# Patient Record
Sex: FEMALE | Race: WHITE | Employment: OTHER | URBAN - METROPOLITAN AREA
[De-identification: names, ages, dates, MRNs, and addresses within clinical notes are randomized per-mention and may not be internally consistent; named-entity substitution may affect disease eponyms.]

---

## 2019-05-09 ENCOUNTER — TRANSFERRED RECORDS (OUTPATIENT)
Dept: HEALTH INFORMATION MANAGEMENT | Facility: CLINIC | Age: 84
End: 2019-05-09

## 2019-06-03 ENCOUNTER — TRANSFERRED RECORDS (OUTPATIENT)
Dept: HEALTH INFORMATION MANAGEMENT | Facility: CLINIC | Age: 84
End: 2019-06-03

## 2019-07-19 ENCOUNTER — TRANSFERRED RECORDS (OUTPATIENT)
Dept: HEALTH INFORMATION MANAGEMENT | Facility: CLINIC | Age: 84
End: 2019-07-19

## 2019-08-27 ENCOUNTER — TRANSFERRED RECORDS (OUTPATIENT)
Dept: HEALTH INFORMATION MANAGEMENT | Facility: CLINIC | Age: 84
End: 2019-08-27

## 2019-10-07 ENCOUNTER — OFFICE VISIT (OUTPATIENT)
Dept: NEUROSURGERY | Facility: CLINIC | Age: 84
End: 2019-10-07
Attending: NEUROLOGICAL SURGERY
Payer: MEDICARE

## 2019-10-07 VITALS
HEART RATE: 66 BPM | DIASTOLIC BLOOD PRESSURE: 82 MMHG | HEIGHT: 67 IN | OXYGEN SATURATION: 98 % | TEMPERATURE: 97.9 F | SYSTOLIC BLOOD PRESSURE: 133 MMHG | BODY MASS INDEX: 21.03 KG/M2 | WEIGHT: 134 LBS | RESPIRATION RATE: 16 BRPM

## 2019-10-07 DIAGNOSIS — M54.16 LUMBAR RADICULOPATHY: Primary | ICD-10-CM

## 2019-10-07 PROCEDURE — G0463 HOSPITAL OUTPT CLINIC VISIT: HCPCS

## 2019-10-07 PROCEDURE — 99213 OFFICE O/P EST LOW 20 MIN: CPT | Performed by: NEUROLOGICAL SURGERY

## 2019-10-07 RX ORDER — LOSARTAN POTASSIUM 50 MG/1
50 TABLET ORAL DAILY
COMMUNITY

## 2019-10-07 RX ORDER — TIMOLOL MALEATE 2.5 MG/ML
1 SOLUTION OPHTHALMIC DAILY
COMMUNITY

## 2019-10-07 ASSESSMENT — PAIN SCALES - GENERAL: PAINLEVEL: NO PAIN (0)

## 2019-10-07 ASSESSMENT — MIFFLIN-ST. JEOR: SCORE: 1090.45

## 2019-10-07 NOTE — NURSING NOTE
"Mirlande Simons is a 84 year old female who presents for:  Chief Complaint   Patient presents with     Follow Up        Initial Vitals:  /82   Pulse 66   Temp 97.9  F (36.6  C) (Oral)   Resp 16   Ht 5' 7\" (1.702 m)   Wt 134 lb (60.8 kg)   SpO2 98%   BMI 20.99 kg/m   Estimated body mass index is 20.99 kg/m  as calculated from the following:    Height as of this encounter: 5' 7\" (1.702 m).    Weight as of this encounter: 134 lb (60.8 kg).. Body surface area is 1.7 meters squared. BP completed using cuff size: regular  No Pain (0)        Nursing Comments: Pt present today for follow up.        Kyle Patel CMA    "

## 2019-10-07 NOTE — LETTER
10/7/2019         RE: Mirlande Simons  [address]  [place] [facility] [address]        Dear Colleague,    Thank you for referring your patient, Mirlande Simons, to the [facility]. Please see a copy of my visit note below.    Ms. Simons is seen in follow-up for persistent difficulty with right leg symptoms.  I was able to access her most recent clinic note from [facility], previously dictated by Ms. Jaye Becerra.  Relevant history is pasted below:    Mirlande Simons is a 84 y.o. female who presents to the clinic for consultation on a five-month history of radicular pain in primarily the right L5 distribution. The patient's outside imaging from May 2019 was reviewed with her today. It was explained that images show multi-level degenerative changes with findings most notable for lateral recess narrowing at the right L4-5 level, and possibly right L5-S1 level as well. On exam, the patient is noted to have decreased sensation along the medial aspect of the right foot and great toe. The patient has attempted conservative management with acupuncture and two lumbar spine ESIs (as ordered through [facility]), without resolution of symptoms.     Surgical options were discussed with the patient today. Based on imaging and clinical evaluation, Ms. Simons would likely benefit from a minimally invasive surgery involving a right-sided lateral recess decompression at the L4-5, and possibly L5-S1, level. For further understanding as to whether surgery should involve a 1- or 2-level decompression, an updated lumbar spine MRI would be necessary to obtained angled axial views of the L5-S1 level. Benefits as well as possible surgical risks were reviewed today including risk of nerve damage, infection, bleeding, residual or recurrent disk / symptoms, and spinal fluid leak were reviewed with the patient. An educational video was viewed by the patient for further  understanding of the surgery as well as pre-operative and post-operative management.     The procedure was described as being a minimally invasive same-day surgery to be performed at [facility]. Normal postoperative recovery was summarized and included not lifting anything greater than 5-10 pounds, avoid excessive bending, twisting, and turning, and to make frequent position changes about every 30 minutes going from sitting, standing, and walking for six weeks approximately. The timing of post-operative follow-up appointments were also discussed. These will take place in the [facility].    As Ms. Simons is not ready to pursue surgery at this time, we will order her a right-sided interlaminar LISA at the L5-S1 level for pain management. When she is ready to proceed with surgery, she will call and updated that clinic and lumbar spine imaging will be repeated. Follow-up in the clinic for surgical planning will then be appropriate.    The patient appeared to have a good understanding of the situation and asked appropriate questions which were answered. If changes or worsening of symptoms should occur in the future, she should seek being evaluated.     She did pursue a third time L5-S1 epidural steroid injection which produced significant improvement in the majority of her right leg pain.  She is seen today at her request because of her persistent feeling of weakness with her right leg when she is ambulating.  She also reports an antalgic type gait toward the right side present with weightbearing and relieved partially with the use of a grocery cart while shopping.    I reexamined her today in the office.  She has a somewhat short stance phase toward the right leg but does not appear to have pain localized to either her right hip or her right knee where she has an arthroplasty.  She has good strength in her distal bilateral lower extremities including 5/5 strength with plantar and dorsiflexion at the  ankles while weightbearing.  Deep tendon reflexes are 1+ and equal at the knees and ankles bilaterally.  There are no long track findings noted.    I again reviewed her most recent lumbar MRI scan which shows fairly significant scoliotic deformity with substantial hypertrophy of the facets through the lower lumbar spine toward the right side which is concave.  She has restricted lateral recess at multiple levels and possibly nerve root impingement right L4-5 and is severely stenotic lateral recess as well as similar changes at right L2-3 and at multiple foramen on the right side.  I had a lengthy discussion with her regarding radiographic findings and the possible correlation to her current symptoms.  I told her that distinguishing the source of the problem is challenging and that the most pronounced abnormality at the L4-5 level does not appear to correlate very well with the pattern or level of her symptoms.  In discussing management alternatives including minimally invasive decompression she expressed a view that she would prefer to consider nonoperative therapies.  I believe that this is sensible given the uncertainty for the origin of this problem and the possibility that even a minimally invasive decompression could introduce significant additional spinal problems.  I have recommended a course of physical therapy with a trial of lumbar traction as well as strengthening exercise for her right lower extremity.  If she finds any symptomatic improvement as a result of that therapy I would not recommend additional invasive measures.  I have asked her to call or return to the office if in fact therapy is not helpful to her.      Again, thank you for allowing me to participate in the care of your patient.        Sincerely,        Leif Santos MD

## 2019-10-07 NOTE — PATIENT INSTRUCTIONS
- Order placed for physical therapy. You can call the phone number highlighted in the order to schedule your appointment. Please call our clinic if symptoms persist after your course of physical therapy.      [facility]  Phone: [phone]  Fax: [phone]

## 2019-10-07 NOTE — PROGRESS NOTES
Ms. Simons is seen in follow-up for persistent difficulty with right leg symptoms.  I was able to access her most recent clinic note from [facility], previously dictated by Ms. Jaye Becerra.  Relevant history is pasted below:    Mirlande Simons is a 84 y.o. female who presents to the clinic for consultation on a five-month history of radicular pain in primarily the right L5 distribution. The patient's outside imaging from May 2019 was reviewed with her today. It was explained that images show multi-level degenerative changes with findings most notable for lateral recess narrowing at the right L4-5 level, and possibly right L5-S1 level as well. On exam, the patient is noted to have decreased sensation along the medial aspect of the right foot and great toe. The patient has attempted conservative management with acupuncture and two lumbar spine ESIs (as ordered through [facility]), without resolution of symptoms.     Surgical options were discussed with the patient today. Based on imaging and clinical evaluation, Ms. Simons would likely benefit from a minimally invasive surgery involving a right-sided lateral recess decompression at the L4-5, and possibly L5-S1, level. For further understanding as to whether surgery should involve a 1- or 2-level decompression, an updated lumbar spine MRI would be necessary to obtained angled axial views of the L5-S1 level. Benefits as well as possible surgical risks were reviewed today including risk of nerve damage, infection, bleeding, residual or recurrent disk / symptoms, and spinal fluid leak were reviewed with the patient. An educational video was viewed by the patient for further understanding of the surgery as well as pre-operative and post-operative management.     The procedure was described as being a minimally invasive same-day surgery to be performed at [facility]. Normal postoperative recovery was summarized and included not lifting  anything greater than 5-10 pounds, avoid excessive bending, twisting, and turning, and to make frequent position changes about every 30 minutes going from sitting, standing, and walking for six weeks approximately. The timing of post-operative follow-up appointments were also discussed. These will take place in the [facility].    As Ms. Simons is not ready to pursue surgery at this time, we will order her a right-sided interlaminar LISA at the L5-S1 level for pain management. When she is ready to proceed with surgery, she will call and updated that clinic and lumbar spine imaging will be repeated. Follow-up in the clinic for surgical planning will then be appropriate.    The patient appeared to have a good understanding of the situation and asked appropriate questions which were answered. If changes or worsening of symptoms should occur in the future, she should seek being evaluated.     She did pursue a third time L5-S1 epidural steroid injection which produced significant improvement in the majority of her right leg pain.  She is seen today at her request because of her persistent feeling of weakness with her right leg when she is ambulating.  She also reports an antalgic type gait toward the right side present with weightbearing and relieved partially with the use of a grocery cart while shopping.    I reexamined her today in the office.  She has a somewhat short stance phase toward the right leg but does not appear to have pain localized to either her right hip or her right knee where she has an arthroplasty.  She has good strength in her distal bilateral lower extremities including 5/5 strength with plantar and dorsiflexion at the ankles while weightbearing.  Deep tendon reflexes are 1+ and equal at the knees and ankles bilaterally.  There are no long track findings noted.    I again reviewed her most recent lumbar MRI scan which shows fairly significant scoliotic deformity with substantial  hypertrophy of the facets through the lower lumbar spine toward the right side which is concave.  She has restricted lateral recess at multiple levels and possibly nerve root impingement right L4-5 and is severely stenotic lateral recess as well as similar changes at right L2-3 and at multiple foramen on the right side.  I had a lengthy discussion with her regarding radiographic findings and the possible correlation to her current symptoms.  I told her that distinguishing the source of the problem is challenging and that the most pronounced abnormality at the L4-5 level does not appear to correlate very well with the pattern or level of her symptoms.  In discussing management alternatives including minimally invasive decompression she expressed a view that she would prefer to consider nonoperative therapies.  I believe that this is sensible given the uncertainty for the origin of this problem and the possibility that even a minimally invasive decompression could introduce significant additional spinal problems.  I have recommended a course of physical therapy with a trial of lumbar traction as well as strengthening exercise for her right lower extremity.  If she finds any symptomatic improvement as a result of that therapy I would not recommend additional invasive measures.  I have asked her to call or return to the office if in fact therapy is not helpful to her.

## 2019-10-14 ENCOUNTER — THERAPY VISIT (OUTPATIENT)
Dept: PHYSICAL THERAPY | Facility: CLINIC | Age: 84
End: 2019-10-14
Attending: NEUROLOGICAL SURGERY
Payer: MEDICARE

## 2019-10-14 DIAGNOSIS — M54.16 LUMBAR RADICULOPATHY: ICD-10-CM

## 2019-10-14 PROCEDURE — 97161 PT EVAL LOW COMPLEX 20 MIN: CPT | Mod: GP | Performed by: PHYSICAL THERAPIST

## 2019-10-14 PROCEDURE — 97110 THERAPEUTIC EXERCISES: CPT | Mod: GP | Performed by: PHYSICAL THERAPIST

## 2019-10-14 NOTE — PROGRESS NOTES
[facility] Initial Evaluation  Subjective:  The history is provided by the patient. No  was used.   Type of problem:  Lumbar   Condition occurred with:  A fall/slip and degenerative joint disease. This is a chronic condition   Problem details: I have scoliosis, stenosis, and piriformis.  In March 2019, I slipped on the steps.   I had two spinal injection but did not work, but the third shot worked.  Three weeks ago  9/17/2019, I slipped in the tub and landed on the left hip bone.  I have right TKA.  When my back is bad the the right knee aches.  Right now I don't have much pain.  If I get out of bed on the left side I have pain.  I am afraid of falling since March.  I have general right hip soreness.  I am very independent..   Patient reports pain:  Central lumbar spine, lumbar spine left and lumbar spine right. Radiates to: weakness not pain  Associated with: weakness and sorenss in the right knee , buckling of the right knee.   Symptoms are exacerbated by walking and relieved by rest.    Mirlande Simons being seen for general leg weakenss right greater than left .   Problem began 10/7/2019. Where condition occurred: for unknown reasons.Problem occurred: unknown  and reported as 2/10 on pain scale. General health as reported by patient is good. Pertinent medical history includes:  High blood pressure, osteoarthritis and osteoporosis.   Other medical allergies details: none.   Other surgery history details: right TKA.  Current medications:  High blood pressure medication.   Primary job tasks include:  Driving, computer work, prolonged sitting and prolonged standing.  Pain is described as aching and shooting and is intermittent. Pain is the same all the time. Since onset symptoms are unchanged (low back pain better, leg weakness the same). Special tests:  MRI (stenosis, scoiliosis). Past treatment: not now.    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    Home/work barriers: apartments-elevator, dog, sons lives with me.  Red flags:  None as reported by patient.                      Objective:  Standing Alignment:    Cervical/thoracic deviations alignment: sitting fair.  Shoulder/UE:  Scapular downward rotation R  Lumbar:  Convex scoliosis R  Pelvic:  Iliac crest high L  Hip:  Greater trochanter high L  Knee deviations alignment: right knee flexed.  Ankle/Foot:  Hallux valgus L and hallux valgus R      Non-Weight Bearing:        Knee:  Patellar lateral tilt R    Flexibility/Screens:         Spine:  Decreased left spine flexibility:  Quadratus Lumborum    Decreased right spine flexibility:  Quadratus Lumborum             Lumbar/SI Evaluation  ROM:    AROM Lumbar:   Flexion:        WFL, no reproduction of pain   Ext:                    Moderate movement   Side Bend:        Left:     Right:   Rotation:           Left:     Right:   Side Glide:        Left:     Right:         Strength: bialteral hip abduction 4.4+/5, TA 1+/5  Lumbar Myotomes:    T12-L3 (Hip Flex):  Left: 5    Right: 4+  L2-4 (Quads):  Left:  5    Right:  5  L4 (Ankle DF):  Left:  5    Right:  5  L5 (Great Toe Ext): Left: 5    Right: 5   S1 (Toe Raise):  Left: 5    Right: 5        Neural Tension/Mobility:      Left side:Slump  negative.   Right side:   Slump (tightness) positive.    Functional Tests:  Core strength and proprioception lumbar: balance 0 sec bilateral                                                    Knee Evaluation:  ROM:      PROM    Hyperextension: Left: 4    Right:  2  Extension: Left: 0    Right:  0  Flexion: Left: 138    Right:  128      Strength:           Quad Set Right: Poor and delayed    Pain:    Special Tests:     Right knee positive for the following tests:  Patellar Tracking-Abduction Lateral  Palpation:      Right knee tenderness present at:  Patellar Lateral    Mobility Testing:       Patellofemoral Medial:  Right: hypomobile              General     ROS    Assessment/Plan:    Patient is a 84 year old female with lumbar and right side knee complaints.    Patient has the following significant findings with corresponding treatment plan.                Diagnosis 1:  Low back pain with right leg weakness  Pain -  manual therapy, self management, education and home program  Decreased joint mobility - manual therapy, therapeutic exercise, therapeutic activity and home program  Decreased strength - therapeutic exercise, therapeutic activities and home program  Impaired balance - neuro re-education, gait training, therapeutic activities and home program  Impaired muscle performance - neuro re-education and home program  Decreased function - therapeutic activities and home program  Impaired posture - neuro re-education, therapeutic activities and home program  Evaluation ongoing.    Therapy Evaluation Codes:   Cumulative Therapy Evaluation is: Low complexity.    Previous and current functional limitations:  (See Goal Flow Sheet for this information)    Short term and Long term goals: (See Goal Flow Sheet for this information)     Communication ability:  Patient appears to be able to clearly communicate and understand verbal and written communication and follow directions correctly.  Treatment Explanation - The following has been discussed with the patient:   RX ordered/plan of care  This patient would benefit from PT intervention to resume normal activities.   Rehab potential is good.    Frequency:  1 X week, once daily  Duration:  for 8 weeks  Discharge Plan:  Achieve all LTG.  Independent in home treatment program.    Please refer to the daily flowsheet for treatment today, total treatment time and time spent performing 1:1 timed codes.

## 2019-10-14 NOTE — LETTER
DEPARTMENT OF HEALTH AND HUMAN SERVICES  CENTERS FOR MEDICARE & MEDICAID SERVICES    PLAN/UPDATED PLAN OF PROGRESS FOR OUTPATIENT REHABILITATION    PATIENTS NAME:  Mirlande Simons   : 1935  PROVIDER NUMBER:    [id number]    HICN: [id number]    PROVIDER NAME: [facility] PH[facility]ICAL [facility]ERAPY    MEDICAL RECORD NUMBER: [id number]     START OF CARE DATE:  SOC Date: 10/14/19   TYPE:  PT    PRIMARY/TREATMENT DIAGNOSIS: (Pertinent Medical Diagnosis)  Lumbar radiculopathy    VISITS FROM START OF CARE:  Rxs Used: 1     [facility] Initial Evaluation    Subjective:  The history is provided by the patient. No  was used.   Type of problem:  Lumbar  Condition occurred with:  A fall/slip and degenerative joint disease. This is a chronic condition   Problem details: I have scoliosis, stenosis, and piriformis.  In 2019, I slipped on the steps.   I had two spinal injection but did not work, but the third shot worked.  Three weeks ago  2019, I slipped in the tub and landed on the left hip bone.  I have right TKA.  When my back is bad the the right knee aches.  Right now I don't have much pain.  If I get out of bed on the left side I have pain.  I am afraid of falling since March.  I have general right hip soreness.  I am very independent..   Patient reports pain:  Central lumbar spine, lumbar spine left and lumbar spine right. Radiates to: weakness not pain  Associated with: weakness and sorenss in the right knee , buckling of the right knee.   Symptoms are exacerbated by walking and relieved by rest.  Mirlande Simons being seen for general leg weakenss right greater than left .   Problem began 10/7/2019. Where condition occurred: for unknown reasons.Problem occurred: unknown  and reported as 2/10 on pain scale. General health as reported by patient is good. Pertinent medical history includes:  High blood pressure, osteoarthritis and osteoporosis.    Other medical allergies details: none.   Other surgery history details: right TKA.  Current medications:  High blood pressure medication.   Primary job tasks include:  Driving, computer work, prolonged sitting and prolonged standing.  Pain is described as aching and shooting and is intermittent. Pain is the same all the time. Since onset symptoms are unchanged (low back pain better, leg weakness the same). Special tests:  MRI (stenosis, scoiliosis). Past treatment: not now.    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  Home/work barriers: apartments-elevator, dog, sons lives with me.  Red flags:  None as reported by patient.            Objective:    PATIENTS NAME:  Mirlande Simons   : 1935    Standing Alignment:    Cervical/thoracic deviations alignment: sitting fair.  Shoulder/UE:  Scapular downward rotation R  Lumbar:  Convex scoliosis R  Pelvic:  Iliac crest high L  Hip:  Greater trochanter high L  Knee deviations alignment: right knee flexed.  Ankle/Foot:  Hallux valgus L and hallux valgus R  Non-Weight Bearing:    Knee:  Patellar lateral tilt R  Flexibility/Screens:   Spine:  Decreased left spine flexibility:  Quadratus Lumborum  Decreased right spine flexibility:  Quadratus Lumborum    Lumbar/SI Evaluation  ROM:    AROM Lumbar:   Flexion:        WFL, no reproduction of pain   Ext:                    Moderate movement   Side Bend:        Left:     Right:   Rotation:           Left:     Right:   Side Glide:        Left:     Right:   Strength: bialteral hip abduction 4.4+/5, TA 1+/5  Lumbar Myotomes:    T12-L3 (Hip Flex):  Left: 5    Right: 4+  L2-4 (Quads):  Left:  5    Right:  5  L4 (Ankle DF):  Left:  5    Right:  5  L5 (Great Toe Ext): Left: 5    Right: 5   S1 (Toe Raise):  Left: 5    Right: 5  Neural Tension/Mobility:    Left side:Slump  negative.   Right side:   Slump (tightness)  positive.  Functional Tests:  Core strength and proprioception lumbar: balance 0 sec bilateral        Knee Evaluation:  ROM:    PROM  Hyperextension: Left: 4    Right:  2  Extension: Left: 0    Right:  0  Flexion: Left: 138    Right:  128  Strength:   Quad Set Right: Poor and delayed    Pain:  Special Tests:   Right knee positive for the following tests:  Patellar Tracking-Abduction Lateral  Palpation:    Right knee tenderness present at:  Patellar Lateral  Mobility Testing:   Patellofemoral Medial:  Right: hypomobile      PATIENTS NAME:  Mirlande Simons   : 1935    Assessment/Plan:    Patient is a 84 year old female with lumbar and right side knee complaints.    Patient has the following significant findings with corresponding treatment plan.                Diagnosis 1:  Low back pain with right leg weakness  Pain -  manual therapy, self management, education and home program  Decreased joint mobility - manual therapy, therapeutic exercise, therapeutic activity and home program  Decreased strength - therapeutic exercise, therapeutic activities and home program  Impaired balance - neuro re-education, gait training, therapeutic activities and home program  Impaired muscle performance - neuro re-education and home program  Decreased function - therapeutic activities and home program  Impaired posture - neuro re-education, therapeutic activities and home program  Evaluation ongoing.    Therapy Evaluation Codes:   Cumulative Therapy Evaluation is: Low complexity.    Previous and current functional limitations:  (See Goal Flow Sheet for this information)    Short term and Long term goals: (See Goal Flow Sheet for this information)     Communication ability:  Patient appears to be able to clearly communicate and understand verbal and written communication and follow directions correctly.  Treatment Explanation - The following has been discussed with the patient:   RX ordered/plan of care  This patient would benefit  "from PT intervention to resume normal activities.   Rehab potential is good.    Frequency:  1 X week, once daily  Duration:  for 8 weeks  Discharge Plan:  Achieve all LTG.  Independent in home treatment program.    Caregiver Signature/Credentials _____________________________ Date ________       Treating Provider: Guerda Fong, PT      I have reviewed and certified the need for these services and plan of treatment while under my care.        PHYSICIAN'S SIGNATURE:   _________________________________________  Date___________   Leif Santos MD    Certification period:  Beginning of Cert date period: 10/14/19 to  End of Cert period date: 12/23/19     Functional Level Progress Report: Please see attached \"Goal Flow sheet for Functional level.\"  ____X____ Continue Services or       ________ DC Services                Service dates: From  SOC Date: 10/14/19 date to present                         "

## 2019-10-31 ENCOUNTER — THERAPY VISIT (OUTPATIENT)
Dept: PHYSICAL THERAPY | Facility: CLINIC | Age: 84
End: 2019-10-31
Payer: MEDICARE

## 2019-10-31 DIAGNOSIS — M54.16 LUMBAR RADICULOPATHY: ICD-10-CM

## 2019-10-31 PROCEDURE — 97110 THERAPEUTIC EXERCISES: CPT | Mod: GP | Performed by: PHYSICAL THERAPIST

## 2019-10-31 PROCEDURE — 97112 NEUROMUSCULAR REEDUCATION: CPT | Mod: GP | Performed by: PHYSICAL THERAPIST

## 2019-10-31 NOTE — LETTER
[facility]  [address]  [place] [address]  [phone]    2019    Re: Mirlande Simons   :   1935  MRN:  [id number]   REFERRING PHYSICIAN:   Leif Santos    [facility]    Date of Initial Evaluation:  10/14/2019  Visits:  Rxs Used: 2  Reason for Referral:  Lumbar radiculopathy    PROGRESS  REPORT  Progress reporting period is from 10/14/2019 to 10/31/2019.       SUBJECTIVE  Subjective changes noted by patient:  I am back to normal.  I am going back to exercises and yoga.   I have a little tenderness along the out side of my right hip     Current Pain level: 0/10.     Previous pain level was  2/10  .   Changes in function:  Yes (See Goal flowsheet attached for changes in current functional level)  Adverse reaction to treatment or activity: None  HPI  Oswestry Score: 0 %    OBJECTIVE  Changes noted in objective findings:  Yes, TROM flexion to the ankles, extension moderate.  Balance fair cueing for proper hip and gluteal control.  Decrease control and engagement of right gluteal.  Strength L/E WFL, right hip abduction 4, 4-/5.  Tenderness along right greater trochanter.  Discussed with patient progression of exercises, health club and home exercise program.     ASSESSMENT/PLAN  Updated problem list and treatment plan: Diagnosis 1:  low back with right leg weakness Decreased ROM/flexibility - home program  Decreased strength - home program  Impaired balance - home program  Impaired muscle performance - home program  Decreased function - home program  STG/LTGs have been met or progress has been made towards goals:  Yes (See Goal flow sheet completed today.)  Assessment of Progress: The patient's condition is improving.  The patient's condition has potential to improve.  Self Management Plans:  Patient has been instructed in a home treatment  program.  Patient  has been instructed in self management of symptoms.    Re: Mirlande Simons   :   1935    Recommendations:  Patient would like to try exercises on her own.  Patient to call if any questions.  Patient does have appointments left and may return if unable to progress with exercises.  Thank you for the opportunity of working with this motivated individual.    Thank you for your referral.    INQUIRIES  Therapist: Guerda Fong PT  [facility] [place] [facility]  [address] [address]  Phone: [phone]  Fax: [phone]

## 2019-11-01 NOTE — PROGRESS NOTES
Subjective:  HPI  Oswestry Score: 0 %                 Objective:  System    Physical Exam    General     ROS    Assessment/Plan:    PROGRESS  REPORT    Progress reporting period is from 10/14/2019 to 10/31/2019.       SUBJECTIVE  Subjective changes noted by patient:  I am back to normal.  I am going back to exercises and yoga.   I have a little tenderness along the out side of my right hip      Current Pain level: 0/10.     Previous pain level was  2/10  .   Changes in function:  Yes (See Goal flowsheet attached for changes in current functional level)  Adverse reaction to treatment or activity: None    OBJECTIVE  Changes noted in objective findings:  Yes, TROM flexion to the ankles, extension moderate.  Balance fair cueing for proper hip and gluteal control.  Decrease control and engagement of right gluteal.  Strength L/E WFL, right hip abduction 4, 4-/5.  Tenderness along right greater trochanter.  Discussed with patient progression of exercises, health club and home exercise program.       ASSESSMENT/PLAN  Updated problem list and treatment plan: Diagnosis 1:  low back with right leg weakness  Decreased ROM/flexibility - home program  Decreased strength - home program  Impaired balance - home program  Impaired muscle performance - home program  Decreased function - home program  STG/LTGs have been met or progress has been made towards goals:  Yes (See Goal flow sheet completed today.)  Assessment of Progress: The patient's condition is improving.  The patient's condition has potential to improve.  Self Management Plans:  Patient has been instructed in a home treatment program.  Patient  has been instructed in self management of symptoms.      Recommendations:  Patient would like to try exercises on her own.  Patient to call if any questions.  Patient does have appointments left and may return if unable to progress with exercises.  Thank you for the opportunity of working with this motivated individual.    Please  refer to the daily flowsheet for treatment today, total treatment time and time spent performing 1:1 timed codes.

## 2020-03-25 PROBLEM — M54.16 LUMBAR RADICULOPATHY: Status: RESOLVED | Noted: 2019-10-14 | Resolved: 2020-03-25

## 2020-03-25 NOTE — PROGRESS NOTES
Patient did not return for further treatment and no additional progress was noted.  Please refer to the progress note and goal flowsheet completed on 10/31/19 for discharge information.

## 2025-04-23 ENCOUNTER — TRANSFERRED RECORDS (OUTPATIENT)
Dept: HEALTH INFORMATION MANAGEMENT | Facility: CLINIC | Age: OVER 89
End: 2025-04-23

## 2025-04-30 ENCOUNTER — TRANSFERRED RECORDS (OUTPATIENT)
Dept: HEALTH INFORMATION MANAGEMENT | Facility: CLINIC | Age: OVER 89
End: 2025-04-30

## 2025-05-27 ENCOUNTER — TRANSFERRED RECORDS (OUTPATIENT)
Dept: HEALTH INFORMATION MANAGEMENT | Facility: CLINIC | Age: OVER 89
End: 2025-05-27
Payer: MEDICARE

## 2025-05-28 ENCOUNTER — MEDICAL CORRESPONDENCE (OUTPATIENT)
Dept: HEALTH INFORMATION MANAGEMENT | Facility: CLINIC | Age: OVER 89
End: 2025-05-28
Payer: MEDICARE

## 2025-05-29 ENCOUNTER — TRANSCRIBE ORDERS (OUTPATIENT)
Dept: OTHER | Age: OVER 89
End: 2025-05-29

## 2025-05-29 DIAGNOSIS — M21.379 DROP FOOT GAIT: Primary | ICD-10-CM

## 2025-06-02 NOTE — CONFIDENTIAL NOTE
NEUROLOGY - PRE VISIT PLANNING             Referring Provider Reason for Referral Office Visit Notes   Mat Styles MD   [facility] Drop foot gait   Physician Referral for foot drop, question vasculitis  5/13/2024 - [facility]        IMAGING/NOTES/SCANS Status/ Location  DATE   MRI/MRA(HEAD, NECK, SPINE) External - [facility] -REQUESTED  External - [facility] - PACS 1/27/2025 - MRI LUMBAR SPINE  5/9/19 - MRI LUMBAR SPINE   CT/CTA   External - [facility] - REQUESTED 4/11/2025 -CT Head      EMG External - [facility]   [facility] 4/30/2025   EEG N/A    LABS External    Neuropsychological testing  N/A    Additional Testing/Notes N/A      Does patient have C2C?  Year last updated Action     YES   []      Please update at appointment if outdated more than 5 years       NO     [x]   N/A   Please complete C2C at appointment

## 2025-06-03 ENCOUNTER — TELEPHONE (OUTPATIENT)
Dept: NEUROSURGERY | Facility: CLINIC | Age: OVER 89
End: 2025-06-03
Payer: COMMERCIAL

## 2025-06-03 NOTE — TELEPHONE ENCOUNTER
Attempted to reach patient to remind them about appointment scheduled with Aliza Sanchez DO on 6/4/25 in our [place] clinic.    A voicemail was left with a call back number if the patient has questions or would like to reschedule.

## 2025-06-04 ENCOUNTER — PRE VISIT (OUTPATIENT)
Dept: NEUROLOGY | Facility: CLINIC | Age: OVER 89
End: 2025-06-04

## 2025-06-06 ENCOUNTER — TELEPHONE (OUTPATIENT)
Dept: NEUROLOGY | Facility: CLINIC | Age: OVER 89
End: 2025-06-06
Payer: COMMERCIAL

## 2025-06-06 NOTE — TELEPHONE ENCOUNTER
Dr. Sanchez requested that RN reach out to pt to see why neuromuscular appt was not scheduled.     Dr. Sanchez stated:  If it's because she is being scheduled far out, please ask her to schedule anyway and I will reach out to neuromuscular directly to see about getting her scheduled quickly.     Spoke with patient.  She stated she couldn't get in until October.     Writer advised pt to schedule and we would try to get her in sooner.  She verbalized understanding.  She said she would like to get in before trip to [place] in September.     Pt did also note that the pregabalin is helping. She was able to get full nights sleep last night.     Writer transferred pt to scheduling to schedule with neuromuscular.     Pt was scheduled with Dr. Tang 6/9/25.    Jacki RN, BSN  [facility] Neurology

## 2025-06-09 ENCOUNTER — MEDICAL CORRESPONDENCE (OUTPATIENT)
Dept: HEALTH INFORMATION MANAGEMENT | Facility: CLINIC | Age: OVER 89
End: 2025-06-09

## 2025-06-09 ENCOUNTER — LAB (OUTPATIENT)
Dept: LAB | Facility: CLINIC | Age: OVER 89
End: 2025-06-09
Payer: COMMERCIAL

## 2025-06-09 ENCOUNTER — OFFICE VISIT (OUTPATIENT)
Dept: NEUROLOGY | Facility: CLINIC | Age: OVER 89
End: 2025-06-09
Payer: COMMERCIAL

## 2025-06-09 ENCOUNTER — PATIENT OUTREACH (OUTPATIENT)
Dept: SURGERY | Facility: CLINIC | Age: OVER 89
End: 2025-06-09

## 2025-06-09 VITALS
OXYGEN SATURATION: 97 % | DIASTOLIC BLOOD PRESSURE: 99 MMHG | SYSTOLIC BLOOD PRESSURE: 178 MMHG | HEART RATE: 74 BPM | RESPIRATION RATE: 16 BRPM

## 2025-06-09 DIAGNOSIS — G58.7 MONONEURITIS MULTIPLEX: Primary | ICD-10-CM

## 2025-06-09 DIAGNOSIS — G62.9 NEUROPATHY: ICD-10-CM

## 2025-06-09 DIAGNOSIS — M21.379 DROP FOOT GAIT: ICD-10-CM

## 2025-06-09 DIAGNOSIS — Z11.59 ENCOUNTER FOR SCREENING FOR OTHER VIRAL DISEASES: ICD-10-CM

## 2025-06-09 DIAGNOSIS — R79.9 ABNORMAL FINDING OF BLOOD CHEMISTRY, UNSPECIFIED: ICD-10-CM

## 2025-06-09 DIAGNOSIS — G58.7 MONONEURITIS MULTIPLEX: ICD-10-CM

## 2025-06-09 LAB
ALBUMIN SERPL BCG-MCNC: 4 G/DL (ref 3.5–5.2)
ALP SERPL-CCNC: 114 U/L (ref 40–150)
ALT SERPL W P-5'-P-CCNC: 70 U/L (ref 0–50)
ANION GAP SERPL CALCULATED.3IONS-SCNC: 11 MMOL/L (ref 7–15)
AST SERPL W P-5'-P-CCNC: 59 U/L (ref 0–45)
BASOPHILS # BLD AUTO: 0.1 10E3/UL (ref 0–0.2)
BASOPHILS NFR BLD AUTO: 1 %
BILIRUB SERPL-MCNC: 0.5 MG/DL
BUN SERPL-MCNC: 17.7 MG/DL (ref 8–23)
CALCIUM SERPL-MCNC: 9 MG/DL (ref 8.8–10.4)
CHLORIDE SERPL-SCNC: 103 MMOL/L (ref 98–107)
CK SERPL-CCNC: 44 U/L (ref 26–192)
CREAT SERPL-MCNC: 0.79 MG/DL (ref 0.51–0.95)
CRP SERPL-MCNC: 7.38 MG/L
EGFRCR SERPLBLD CKD-EPI 2021: 71 ML/MIN/1.73M2
EOSINOPHIL # BLD AUTO: 0.3 10E3/UL (ref 0–0.7)
EOSINOPHIL NFR BLD AUTO: 5 %
ERYTHROCYTE [DISTWIDTH] IN BLOOD BY AUTOMATED COUNT: 14.6 % (ref 10–15)
ERYTHROCYTE [SEDIMENTATION RATE] IN BLOOD BY WESTERGREN METHOD: 15 MM/HR (ref 0–30)
EST. AVERAGE GLUCOSE BLD GHB EST-MCNC: 117 MG/DL
GLUCOSE SERPL-MCNC: 98 MG/DL (ref 70–99)
HBA1C MFR BLD: 5.7 %
HBV CORE AB SERPL QL IA: NONREACTIVE
HBV SURFACE AB SERPL IA-ACNC: <3.5 M[IU]/ML
HBV SURFACE AB SERPL IA-ACNC: NONREACTIVE M[IU]/ML
HBV SURFACE AG SERPL QL IA: NONREACTIVE
HCO3 SERPL-SCNC: 26 MMOL/L (ref 22–29)
HCT VFR BLD AUTO: 37.8 % (ref 35–47)
HCV AB SERPL QL IA: NONREACTIVE
HGB BLD-MCNC: 12 G/DL (ref 11.7–15.7)
HIV 1+2 AB+HIV1 P24 AG SERPL QL IA: NONREACTIVE
IMM GRANULOCYTES # BLD: 0.1 10E3/UL
IMM GRANULOCYTES NFR BLD: 1 %
LAB ORDER RESULT STATUS: NORMAL
LYMPHOCYTES # BLD AUTO: 1.3 10E3/UL (ref 0.8–5.3)
LYMPHOCYTES NFR BLD AUTO: 19 %
Lab: NORMAL
MCH RBC QN AUTO: 28.6 PG (ref 26.5–33)
MCHC RBC AUTO-ENTMCNC: 31.7 G/DL (ref 31.5–36.5)
MCV RBC AUTO: 90 FL (ref 78–100)
MONOCYTES # BLD AUTO: 0.8 10E3/UL (ref 0–1.3)
MONOCYTES NFR BLD AUTO: 11 %
NEUTROPHILS # BLD AUTO: 4.6 10E3/UL (ref 1.6–8.3)
NEUTROPHILS NFR BLD AUTO: 65 %
NRBC # BLD AUTO: 0 10E3/UL
NRBC BLD AUTO-RTO: 0 /100
PERFORMING LABORATORY: NORMAL
PLATELET # BLD AUTO: 224 10E3/UL (ref 150–450)
POTASSIUM SERPL-SCNC: 3.7 MMOL/L (ref 3.4–5.3)
PROT SERPL-MCNC: 6.7 G/DL (ref 6.4–8.3)
RBC # BLD AUTO: 4.2 10E6/UL (ref 3.8–5.2)
RHEUMATOID FACT SERPL-ACNC: 11 IU/ML
SODIUM SERPL-SCNC: 140 MMOL/L (ref 135–145)
TEST NAME: NORMAL
TOTAL PROTEIN SERUM FOR ELP: 6.5 G/DL (ref 6.4–8.3)
WBC # BLD AUTO: 7.1 10E3/UL (ref 4–11)

## 2025-06-09 PROCEDURE — 86038 ANTINUCLEAR ANTIBODIES: CPT | Performed by: PSYCHIATRY & NEUROLOGY

## 2025-06-09 PROCEDURE — 99000 SPECIMEN HANDLING OFFICE-LAB: CPT | Performed by: PATHOLOGY

## 2025-06-09 PROCEDURE — 99215 OFFICE O/P EST HI 40 MIN: CPT | Performed by: STUDENT IN AN ORGANIZED HEALTH CARE EDUCATION/TRAINING PROGRAM

## 2025-06-09 PROCEDURE — 86160 COMPLEMENT ANTIGEN: CPT | Performed by: PSYCHIATRY & NEUROLOGY

## 2025-06-09 PROCEDURE — 84165 PROTEIN E-PHORESIS SERUM: CPT | Mod: 26 | Performed by: STUDENT IN AN ORGANIZED HEALTH CARE EDUCATION/TRAINING PROGRAM

## 2025-06-09 PROCEDURE — 84165 PROTEIN E-PHORESIS SERUM: CPT | Mod: TC | Performed by: STUDENT IN AN ORGANIZED HEALTH CARE EDUCATION/TRAINING PROGRAM

## 2025-06-09 PROCEDURE — 86617 LYME DISEASE ANTIBODY: CPT | Performed by: PSYCHIATRY & NEUROLOGY

## 2025-06-09 PROCEDURE — 3077F SYST BP >= 140 MM HG: CPT | Performed by: STUDENT IN AN ORGANIZED HEALTH CARE EDUCATION/TRAINING PROGRAM

## 2025-06-09 PROCEDURE — 3044F HG A1C LEVEL LT 7.0%: CPT | Performed by: STUDENT IN AN ORGANIZED HEALTH CARE EDUCATION/TRAINING PROGRAM

## 2025-06-09 PROCEDURE — 86334 IMMUNOFIX E-PHORESIS SERUM: CPT | Mod: 26 | Performed by: STUDENT IN AN ORGANIZED HEALTH CARE EDUCATION/TRAINING PROGRAM

## 2025-06-09 PROCEDURE — 83521 IG LIGHT CHAINS FREE EACH: CPT | Performed by: PSYCHIATRY & NEUROLOGY

## 2025-06-09 PROCEDURE — 86036 ANCA SCREEN EACH ANTIBODY: CPT | Performed by: PSYCHIATRY & NEUROLOGY

## 2025-06-09 PROCEDURE — 86803 HEPATITIS C AB TEST: CPT | Performed by: STUDENT IN AN ORGANIZED HEALTH CARE EDUCATION/TRAINING PROGRAM

## 2025-06-09 PROCEDURE — 86431 RHEUMATOID FACTOR QUANT: CPT | Performed by: PSYCHIATRY & NEUROLOGY

## 2025-06-09 PROCEDURE — 87389 HIV-1 AG W/HIV-1&-2 AB AG IA: CPT | Performed by: STUDENT IN AN ORGANIZED HEALTH CARE EDUCATION/TRAINING PROGRAM

## 2025-06-09 PROCEDURE — 99417 PROLNG OP E/M EACH 15 MIN: CPT | Performed by: STUDENT IN AN ORGANIZED HEALTH CARE EDUCATION/TRAINING PROGRAM

## 2025-06-09 PROCEDURE — 3080F DIAST BP >= 90 MM HG: CPT | Performed by: STUDENT IN AN ORGANIZED HEALTH CARE EDUCATION/TRAINING PROGRAM

## 2025-06-09 PROCEDURE — 86235 NUCLEAR ANTIGEN ANTIBODY: CPT | Performed by: PSYCHIATRY & NEUROLOGY

## 2025-06-09 PROCEDURE — 86706 HEP B SURFACE ANTIBODY: CPT | Performed by: STUDENT IN AN ORGANIZED HEALTH CARE EDUCATION/TRAINING PROGRAM

## 2025-06-09 PROCEDURE — 83036 HEMOGLOBIN GLYCOSYLATED A1C: CPT | Performed by: STUDENT IN AN ORGANIZED HEALTH CARE EDUCATION/TRAINING PROGRAM

## 2025-06-09 PROCEDURE — 83516 IMMUNOASSAY NONANTIBODY: CPT | Performed by: PSYCHIATRY & NEUROLOGY

## 2025-06-09 PROCEDURE — 87340 HEPATITIS B SURFACE AG IA: CPT | Performed by: STUDENT IN AN ORGANIZED HEALTH CARE EDUCATION/TRAINING PROGRAM

## 2025-06-09 PROCEDURE — 1125F AMNT PAIN NOTED PAIN PRSNT: CPT | Performed by: STUDENT IN AN ORGANIZED HEALTH CARE EDUCATION/TRAINING PROGRAM

## 2025-06-09 PROCEDURE — 86618 LYME DISEASE ANTIBODY: CPT | Performed by: PSYCHIATRY & NEUROLOGY

## 2025-06-09 PROCEDURE — 86334 IMMUNOFIX E-PHORESIS SERUM: CPT | Performed by: STUDENT IN AN ORGANIZED HEALTH CARE EDUCATION/TRAINING PROGRAM

## 2025-06-09 PROCEDURE — 86704 HEP B CORE ANTIBODY TOTAL: CPT | Performed by: STUDENT IN AN ORGANIZED HEALTH CARE EDUCATION/TRAINING PROGRAM

## 2025-06-09 ASSESSMENT — PAIN SCALES - GENERAL: PAINLEVEL_OUTOF10: MODERATE PAIN (4)

## 2025-06-09 NOTE — PROGRESS NOTES
CHIEF COMPLAINT / REASON FOR VISIT  Subacute right leg weakness, numbness, and pain    Referred by Dr. Sanchez    HISTORY OF PRESENT ILLNESS   is a 89 year old female presenting to [facility] for evaluation of subacute right leg weakness, numbness, and pain . Patient is accompanied by her friend, who helped provide collateral history today.    She reports chronic lower back pain and occasional sciatica down right>left legs for many years due to lumbar scoliosis and spondylosis.     In February, she tripped over her bedspread at night and fell. She developed worsening back pain after the fall but was still able to walk. The back pain eventually improved. In April (2 months ago), she developed an acute onset of weakness in the right leg. The weakness progressed to a complete foot drop over a few days. She started using a cane then. The weakness was associated with numbness of the entire foot and distal leg as well as frequent shooting pain/zinging in the same area. She was evaluated in [facility] and received epidural injection without any change in her symptoms. The weakness and numbness have not changed in the past month (no improvement or worsening). She switched from a cane to a walker 4 weeks ago for more stability. She currently denies lower back pain or radicular pain down right leg. Denies any symptoms on the left leg or upper limbs. No change in bowel/bladder control. She has not received any PT but started using AFO a month ago with improvement in gait. Lyrica 50 mg BID has helped with zinging pain.     She lost 10 lbs in the past 2 months due to loss of appetite.   Ms. Simons denies history of rash or joint pain. No history of cancer, chemotherapy or radiation.   She drinks 2 glasses of wine every day since her 40s.     I have reviewed prior investigations as listed.   - EMG 4/30/2025: Bilateral sural and superficial peroneal SNAPs were absent. Right peroneal and tibial CMAP were  absent. Left peroneal CMAP amplitude was also reduced. Needle EMG showed fibrillation potentials with no voluntary MUAP in ight EHL, peroneus longus, FDL, gastrocnemius and severely reduced recruitment of MUAP in tibialis anterior. Needle EMG of right biceps femoris short head, G medius, G anna were normal.     - JOSÉ MIGUEL, ANCA, ESR were normal  - CRP 61.2  - vitamin B12 was 247    - MRI thigh: Mild to moderate right and mild left gluteus anna muscle atrophy   - MRI knee: S/p total knee arthroplasty. No hardware complication. No neurovascular abnormality. No periprosthetic fracture or stress reaction. Small knee joint effusion. No popliteal cyst.  - MRI lower leg: Moderate generalized edema throughout the right calf musculature particular proximally which could reflex a grade 1 strain or early denervation changes. No muscle atrophy. No discrete neurovascular abnormality.      - MRI L-spine: Impression:   1. Lumbar levoscoliosis with multilevel low-grade spondylolisthesis.   2. Lumbar spondylosis as detailed, progressed relative to 09/07/2006.   3. At L3-L4, moderate right neural foraminal stenosis, progressed, with similar right lateral recess narrowing potentially impinging the descending right L4 nerve root.   4. At L4-L5, similar moderate right neural foraminal stenosis and right lateral recess stenosis likely impinging the descending right L5 nerve root.   5. At L5-S1, moderate right neural foraminal stenosis and right lateral recess stenosis likely impinging the descending right S1 nerve root, progressed.     REVIEW OF SYSTEMS  All negative except for what indicated in the HPI. The following portions of the patient's history were reviewed and updated as appropriate: allergies, current medications, family history, medical history, surgical history, social history, and problem list.     PAST MEDICAL/SURGICAL HISTORY   No past medical history on file.  Past Surgical History:   Procedure Laterality Date    IR  LUMBAR PUNCTURE  8/27/2019        MEDICATIONS    Current Outpatient Medications:     furosemide (LASIX) 20 MG tablet, 10mg oral on Monday and Friday, Disp: , Rfl:     hydrochlorothiazide (HYDRODIURIL) 25 MG tablet, TAKE 1 TABLET BY MOUTH EVERY DAY IN THE MORNING for 90 (Patient not taking: Reported on 6/4/2025), Disp: , Rfl:     IBANdronate (BONIVA) 150 MG tablet, Take 150 mg by mouth every 30 days., Disp: , Rfl:     ipratropium (ATROVENT) 0.06 % nasal spray, SPRAY 2 SPRAYS BY INTRANASAL ROUTE 3 TIMES EVERY DAY IN EACH NOSTRIL, Disp: , Rfl:     lamoTRIgine (LAMICTAL) 25 MG tablet, TAKE 1 TABLET BY MOUTH ONCE DAILY FOR 2 WEEKS TO TREAT SEVERE LANCINATING NEUROPATHIS THEN INCREASE TO 2 TABLETS GOING FORWARD, Disp: , Rfl:     losartan (COZAAR) 25 MG tablet, , Disp: , Rfl:     losartan (COZAAR) 50 MG tablet, Take 50 mg by mouth daily, Disp: , Rfl:     pregabalin (LYRICA) 50 MG capsule, Take 1 capsule (50 mg) by mouth 2 times daily., Disp: 60 capsule, Rfl: 5    timolol maleate (TIMOPTIC-XE) 0.25 % ophthalmic gel-form, 1 drop daily, Disp: , Rfl:     ALLERGIES:  Allergies   Allergen Reactions    Minocycline Rash and Other (See Comments)     History of nodules on legs and diffuse rash on body.    Minocycline toxicity, Rash    Minocycline toxicity, Rash      History of nodules on legs and diffuse rash on body.       PHYSICAL EXAM    NEUROLOGICAL EXAMINATION  Mental status: normal.  Speech: normal.  High arched feet: Absent  Hammertoes: Absent  Coordination: normal rapid alternating movements and finger to nose testing  Gait: Right steppage gait  Getting up from seated position without pushing on chair: no  Posture: normal.    The Neuropathy Impairment Scoring System (NIS) strength scale was utilized.    0=normal; -1=25% weak; -2=50% weak; -3=75% weak; -3.25=movement against gravity;   -3.5=movement, gravity eliminated;-3.75=minimal contraction; -4= paralysis.  Cranial nerves   R Muscle strength L  R  L   0 Frontalis 0    Visual acuity    0 Orbicularis oculi 0  3 mm Pupils 3 mm   0 Lower facial muscles 0  0 Light reflex 0   0 Temporal-[place] 0  0 Ptosis 0   0 Palate-Pharynx 0  0 Extraocular muscles 0   0 Sternocleidomastoid 0       0 Trapezius 0   Hearing    0 Genioglossus 0              R Muscle, strength L  R Muscle, strength L    Neck     Trunk    0 Neck flexors 0   Abdominal muscles    0 Neck extensors 0   Paraspinals     Upper Limbs    Lower Limbs    0 Supraspinatus 0  0 Iliopsoas 0   0 Infraspinatus 0  0 Adductors, thigh 0   0 Pectoralis 0  0 Gluteus medius 0   0 Deltoid 0  0 Gluteus max 0   0 Biceps brachii 0  0 Quadriceps 0   0 Brachioradialis 0  0 Hamstrings 0    Supinator/pronator   -4 Tibialis anterior 0   0 Triceps 0  -4 Peronei 0   0 Wrist extensor 0  -4 EHL 0   0 Wrist flexors 0  -4 Toe extensors 0   0 Digit extensors 0  -4 Tibialis post. 0   0 Digit flexors 0  -4 Toe flexors 0   0 Thenar 0  -4 Calf muscles 0   0 Hypothenar 0       0 Interossei 0            R Reflexes L  R Sensation L   For NIS:  Normal=0 Reduced=1 Absent=2     0 Biceps brachii 0   Finger index    0 Brachioradialis 0  0 Cold 0   0 Triceps 0  0 Pinprick 0   0 Naidu 0  0 Vibration 0   0 Quadriceps 0   Joint position    -4 Gastroc-soleus -4   Toes (Great)    0 Clonus (ankle) 0  -3 Cold 0   Flexor Plantar response Flexor  -3 Pinprick 0     -4 Vibration -1      Joint position    Reduced sensation in the peroneal>tibial distributions in the right lower limb       ASSESSMENT / PLAN  #1 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 Simons is a 89 year old female presenting with right lower limb weakness, numbness, and pain that started acutely 2 months ago (in April 2025) and progressed over a few days to complete right foot drop. She also has history of chronic lower back  pain due to lumbar spondylosis. Exam today showed severe weakness in all right peroneal+right tibial innervated muscles and loss of sensation to all modalities in these distributions. Exam and outside EMG findings are most suggestive of right distal sciatic (or peroneal + tibial) mononeuropathy. 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 Differential diagnosis include acute on chronic right L5-S1 radiculopathy but this is less likely given 1) normal strength and EMG in proximal L5 and S1 muscles, 2) rapid progression, 3) lack of worsening back pain/radicular pain and 4) lack of improvement with epidural injections. She does not report any signs of systemic inflammatory disease/underlying rheumatological diseases other than weight loss. This could be non-systemic vasculitic process that can occur post trauma.     After discussion with Ms. Simons, we have agreed to proceed with the following investigations and management:    Recommendations:  - Agree with CBC, CMP, JOSÉ MIGUEL, RF, ANCA, SPEP/DIANE, ESR, CRP. Will add serum free light chains, autoimmune axonal neuropathy panel, HbA1c, chronic hepatitis profile, and HIV.   - Right whole nerve sural biopsy. Discussed risks, goals, alternatives, and complications of right sural nerve biopsy with the patient, including but not limited to infection, numbness, and local neuropathic discomfort in the distribution of the biopsied nerve. They also understand that while the neuropathic discomfort generally improves with time, there are rare cases in which there is persistent neuropathic pain necessitating management with neuromodulatory medications. The patient understands and wishes to proceed.  - Continue pregabalin 50 BID for symptomatic management.   - Will plan to start a trial of prednisone 60 mg daily after sural nerve biopsy and reevaluate after a month.    A return appointment will be  scheduled after all the above tests and evaluations.      I spent a total of 90 minutes on the day of the visit for chart review, face-to-face visit, counseling/coordination of care, and documentation. Please see the note for further information on patient assessment and treatment.       PATIENT EDUCATION  Ready to learn, no apparent learning barriers were identified; learning preferences include listening.  Explained diagnosis and treatment plan; patient expressed understanding of the content.

## 2025-06-09 NOTE — PROGRESS NOTES
06/09/25    2:38 PM     Attempted to reach the patient to schedule a nerve biopsy consult ASAP. No answer, LM on VM to call office.  Direct dial provided.

## 2025-06-09 NOTE — LETTER
6/9/2025       RE: Mirlande Simons  [address]  [facility] [address]     Dear Colleague,    Thank you for referring your patient, Mirlaned Simons, to the [facility]UROLOG[facility] [place] at [facility]. Please see a copy of my visit note below.    CHIEF COMPLAINT / REASON FOR VISIT  Subacute right leg weakness, numbness, and pain    Referred by Dr. Sanchez    HISTORY OF PRESENT ILLNESS   is a 89 year old female presenting to Neuromuscular Clinic for evaluation of subacute right leg weakness, numbness, and pain . Patient is accompanied by her friend, who helped provide collateral history today.    She reports chronic lower back pain and occasional sciatica down right>left legs for many years due to lumbar scoliosis and spondylosis.     In February, she tripped over her bedspread at night and fell. She developed worsening back pain after the fall but was still able to walk. The back pain eventually improved. In April (2 months ago), she developed an acute onset of weakness in the right leg. The weakness progressed to a complete foot drop over a few days. She started using a cane then. The weakness was associated with numbness of the entire foot and distal leg as well as frequent shooting pain/zinging in the same area. She was evaluated in [facility] and received epidural injection without any change in her symptoms. The weakness and numbness have not changed in the past month (no improvement or worsening). She switched from a cane to a walker 4 weeks ago for more stability. She currently denies lower back pain or radicular pain down right leg. Denies any symptoms on the left leg or upper limbs. No change in bowel/bladder control. She has not received any PT but started using AFO a month ago with improvement in gait. Lyrica 50 mg BID has helped with zinging pain.     She lost 10 lbs in the past 2 months due to loss of  appetite.   Ms. Simons denies history of rash or joint pain. No history of cancer, chemotherapy or radiation.   She drinks 2 glasses of wine every day since her 40s.     I have reviewed prior investigations as listed.   - EMG 4/30/2025: Bilateral sural and superficial peroneal SNAPs were absent. Right peroneal and tibial CMAP were absent. Left peroneal CMAP amplitude was also reduced. Needle EMG showed fibrillation potentials with no voluntary MUAP in ight EHL, peroneus longus, FDL, gastrocnemius and severely reduced recruitment of MUAP in tibialis anterior. Needle EMG of right biceps femoris short head, G medius, G anna were normal.     - JOSÉ MIGUEL, ANCA, ESR were normal  - CRP 61.2  - vitamin B12 was 247    - MRI thigh: Mild to moderate right and mild left gluteus anna muscle atrophy   - MRI knee: S/p total knee arthroplasty. No hardware complication. No neurovascular abnormality. No periprosthetic fracture or stress reaction. Small knee joint effusion. No popliteal cyst.  - MRI lower leg: Moderate generalized edema throughout the right calf musculature particular proximally which could reflex a grade 1 strain or early denervation changes. No muscle atrophy. No discrete neurovascular abnormality.      - MRI L-spine: Impression:   1. Lumbar levoscoliosis with multilevel low-grade spondylolisthesis.   2. Lumbar spondylosis as detailed, progressed relative to 09/07/2006.   3. At L3-L4, moderate right neural foraminal stenosis, progressed, with similar right lateral recess narrowing potentially impinging the descending right L4 nerve root.   4. At L4-L5, similar moderate right neural foraminal stenosis and right lateral recess stenosis likely impinging the descending right L5 nerve root.   5. At L5-S1, moderate right neural foraminal stenosis and right lateral recess stenosis likely impinging the descending right S1 nerve root, progressed.     REVIEW OF SYSTEMS  All negative except for what indicated in the HPI. The  following portions of the patient's history were reviewed and updated as appropriate: allergies, current medications, family history, medical history, surgical history, social history, and problem list.     PAST MEDICAL/SURGICAL HISTORY   No past medical history on file.  Past Surgical History:   Procedure Laterality Date     IR LUMBAR PUNCTURE  8/27/2019        MEDICATIONS    Current Outpatient Medications:      furosemide (LASIX) 20 MG tablet, 10mg oral on Monday and Friday, Disp: , Rfl:      hydrochlorothiazide (HYDRODIURIL) 25 MG tablet, TAKE 1 TABLET BY MOUTH EVERY DAY IN THE MORNING for 90 (Patient not taking: Reported on 6/4/2025), Disp: , Rfl:      IBANdronate (BONIVA) 150 MG tablet, Take 150 mg by mouth every 30 days., Disp: , Rfl:      ipratropium (ATROVENT) 0.06 % nasal spray, SPRAY 2 SPRAYS BY INTRANASAL ROUTE 3 TIMES EVERY DAY IN EACH NOSTRIL, Disp: , Rfl:      lamoTRIgine (LAMICTAL) 25 MG tablet, TAKE 1 TABLET BY MOUTH ONCE DAILY FOR 2 WEEKS TO TREAT SEVERE LANCINATING NEUROPATHIS THEN INCREASE TO 2 TABLETS GOING FORWARD, Disp: , Rfl:      losartan (COZAAR) 25 MG tablet, , Disp: , Rfl:      losartan (COZAAR) 50 MG tablet, Take 50 mg by mouth daily, Disp: , Rfl:      pregabalin (LYRICA) 50 MG capsule, Take 1 capsule (50 mg) by mouth 2 times daily., Disp: 60 capsule, Rfl: 5     timolol maleate (TIMOPTIC-XE) 0.25 % ophthalmic gel-form, 1 drop daily, Disp: , Rfl:     ALLERGIES:  Allergies   Allergen Reactions     Minocycline Rash and Other (See Comments)     History of nodules on legs and diffuse rash on body.    Minocycline toxicity, Rash    Minocycline toxicity, Rash      History of nodules on legs and diffuse rash on body.       PHYSICAL EXAM    NEUROLOGICAL EXAMINATION  Mental status: normal.  Speech: normal.  High arched feet: Absent  Hammertoes: Absent  Coordination: normal rapid alternating movements and finger to nose testing  Gait: Right steppage gait  Getting up from seated position without  pushing on chair: no  Posture: normal.    The Neuropathy Impairment Scoring System (NIS) strength scale was utilized.    0=normal; -1=25% weak; -2=50% weak; -3=75% weak; -3.25=movement against gravity;   -3.5=movement, gravity eliminated;-3.75=minimal contraction; -4= paralysis.  Cranial nerves   R Muscle strength L  R  L   0 Frontalis 0   Visual acuity    0 Orbicularis oculi 0  3 mm Pupils 3 mm   0 Lower facial muscles 0  0 Light reflex 0   0 Temporal-Masseter 0  0 Ptosis 0   0 Palate-Pharynx 0  0 Extraocular muscles 0   0 Sternocleidomastoid 0       0 Trapezius 0   Hearing    0 Genioglossus 0              R Muscle, strength L  R Muscle, strength L    Neck     Trunk    0 Neck flexors 0   Abdominal muscles    0 Neck extensors 0   Paraspinals     Upper Limbs    Lower Limbs    0 Supraspinatus 0  0 Iliopsoas 0   0 Infraspinatus 0  0 Adductors, thigh 0   0 Pectoralis 0  0 Gluteus medius 0   0 Deltoid 0  0 Gluteus max 0   0 Biceps brachii 0  0 Quadriceps 0   0 Brachioradialis 0  0 Hamstrings 0    Supinator/pronator   -4 Tibialis anterior 0   0 Triceps 0  -4 Peronei 0   0 Wrist extensor 0  -4 EHL 0   0 Wrist flexors 0  -4 Toe extensors 0   0 Digit extensors 0  -4 Tibialis post. 0   0 Digit flexors 0  -4 Toe flexors 0   0 Thenar 0  -4 Calf muscles 0   0 Hypothenar 0       0 Interossei 0            R Reflexes L  R Sensation L   For NIS:  Normal=0 Reduced=1 Absent=2     0 Biceps brachii 0   Finger index    0 Brachioradialis 0  0 Cold 0   0 Triceps 0  0 Pinprick 0   0 Naidu 0  0 Vibration 0   0 Quadriceps 0   Joint position    -4 Gastroc-soleus -4   Toes (Great)    0 Clonus (ankle) 0  -3 Cold 0   Flexor Plantar response Flexor  -3 Pinprick 0     -4 Vibration -1      Joint position    Reduced sensation in the peroneal>tibial distributions in the right lower limb       ASSESSMENT / PLAN  #1 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 Simons is a 89 year old female presenting with right lower limb weakness, numbness, and pain that started acutely 2 months ago (in April 2025) and progressed over a few days to complete right foot drop. She also has history of chronic lower back pain due to lumbar spondylosis. Exam today showed severe weakness in all right peroneal+right tibial innervated muscles and loss of sensation to all modalities in these distributions. Exam and outside EMG findings are most suggestive of right distal sciatic (or peroneal + tibial) mononeuropathy. 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 Differential diagnosis include acute on chronic right L5-S1 radiculopathy but this is less likely given 1) normal strength and EMG in proximal L5 and S1 muscles, 2) rapid progression, 3) lack of worsening back pain/radicular pain and 4) lack of improvement with epidural injections. She does not report any signs of systemic inflammatory disease/underlying rheumatological diseases other than weight loss.    After discussion with Ms. Simons, we have agreed to proceed with the following investigations and management:    Recommendations:  - Agree with CBC, CMP, JOSÉ MIGUEL, RF, ANCA, SPEP/DIANE, ESR, CRP. Will add serum free light chains, autoimmune axonal neuropathy panel, HbA1c, chronic hepatitis profile, and HIV.   - Right whole nerve sural biopsy. Discussed risks, goals, alternatives, and complications of right sural nerve biopsy with the patient, including but not limited to infection, numbness, and local neuropathic discomfort in the distribution of the biopsied nerve. They also understand that while the neuropathic discomfort generally improves with time, there are rare cases in which there is persistent neuropathic pain necessitating management with  neuromodulatory medications. The patient understands and wishes to proceed.  - Continue pregabalin 50 BID for symptomatic management.   - Will plan to start a trial of prednisone 60 mg daily after sural nerve biopsy and reevaluate after a month.    A return appointment will be scheduled after all the above tests and evaluations.      I spent a total of 90 minutes on the day of the visit for chart review, face-to-face visit, counseling/coordination of care, and documentation. Please see the note for further information on patient assessment and treatment.       PATIENT EDUCATION  Ready to learn, no apparent learning barriers were identified; learning preferences include listening.  Explained diagnosis and treatment plan; patient expressed understanding of the content.      Again, thank you for allowing me to participate in the care of your patient.      Sincerely,    Zo Tang MD

## 2025-06-10 LAB
ALBUMIN SERPL ELPH-MCNC: 3.8 G/DL (ref 3.7–5.1)
ALPHA1 GLOB SERPL ELPH-MCNC: 0.4 G/DL (ref 0.2–0.4)
ALPHA2 GLOB SERPL ELPH-MCNC: 0.7 G/DL (ref 0.5–0.9)
ANA SER QL IF: NEGATIVE
ANCA AB PATTERN SER IF-IMP: NORMAL
B BURGDOR IGG+IGM SER QL: 0.94
B-GLOBULIN SERPL ELPH-MCNC: 0.6 G/DL (ref 0.6–1)
C-ANCA TITR SER IF: NORMAL {TITER}
C3 SERPL-MCNC: 117 MG/DL (ref 81–157)
C4 SERPL-MCNC: 29 MG/DL (ref 13–39)
ENA SS-A AB SER IA-ACNC: <0.5 U/ML
ENA SS-A AB SER IA-ACNC: NEGATIVE
ENA SS-B IGG SER IA-ACNC: <0.6 U/ML
ENA SS-B IGG SER IA-ACNC: NEGATIVE
GAMMA GLOB SERPL ELPH-MCNC: 0.9 G/DL (ref 0.7–1.6)
KAPPA LC FREE SER-MCNC: 3.37 MG/DL (ref 0.33–1.94)
KAPPA LC FREE/LAMBDA FREE SER NEPH: 1.15 {RATIO} (ref 0.26–1.65)
LAMBDA LC FREE SERPL-MCNC: 2.94 MG/DL (ref 0.57–2.63)
M PROTEIN SERPL ELPH-MCNC: 0 G/DL
MYELOPEROXIDASE AB SER IA-ACNC: <0.3 U/ML
MYELOPEROXIDASE AB SER IA-ACNC: NEGATIVE
PROT PATTERN SERPL ELPH-IMP: NORMAL
PROT PATTERN SERPL IFE-IMP: NORMAL
PROTEINASE3 AB SER IA-ACNC: <1 U/ML
PROTEINASE3 AB SER IA-ACNC: NEGATIVE

## 2025-06-11 ENCOUNTER — TELEPHONE (OUTPATIENT)
Dept: SURGERY | Facility: CLINIC | Age: OVER 89
End: 2025-06-11
Payer: COMMERCIAL

## 2025-06-11 LAB
B BURGDOR IGG SERPL QL IA: 0.05 INDEX
B BURGDOR IGG SERPL QL IA: NONREACTIVE
B BURGDOR IGM SERPL QL IA: 0.13 INDEX
B BURGDOR IGM SERPL QL IA: NONREACTIVE

## 2025-06-11 NOTE — TELEPHONE ENCOUNTER
[facility] Call Center    Phone Message    May a detailed message be left on voicemail: yes     Reason for Call: Other: Patient called to scheduled for Vasculitic neuropath;  consult for urgent right sural nerve biopsy.  Per call to Sharron on backline, sending message.  Please follow up with patient.     Action Taken: Message routed to:  [facility] ([facility]    Travel Screening: Not Applicable     06/11/25    1:55 PM     Video visit arranged with Dr. Garvey on 6/12 at 0730.  Form scanned into Media 6/10.  Leah Davenport RN, BSN, CNOR, RNFA  [facility] General Surgery

## 2025-06-11 NOTE — TELEPHONE ENCOUNTER
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

## 2025-06-12 ENCOUNTER — VIRTUAL VISIT (OUTPATIENT)
Dept: SURGERY | Facility: CLINIC | Age: OVER 89
End: 2025-06-12
Attending: STUDENT IN AN ORGANIZED HEALTH CARE EDUCATION/TRAINING PROGRAM
Payer: COMMERCIAL

## 2025-06-12 ENCOUNTER — PRE VISIT (OUTPATIENT)
Dept: SURGERY | Facility: CLINIC | Age: OVER 89
End: 2025-06-12

## 2025-06-12 VITALS — WEIGHT: 115 LBS | BODY MASS INDEX: 18.05 KG/M2 | HEIGHT: 67 IN

## 2025-06-12 DIAGNOSIS — G58.7 MONONEURITIS MULTIPLEX: ICD-10-CM

## 2025-06-12 RX ORDER — CEFAZOLIN SODIUM 2 G/50ML
2 SOLUTION INTRAVENOUS
OUTPATIENT
Start: 2025-06-12

## 2025-06-12 RX ORDER — CEFAZOLIN SODIUM 2 G/50ML
2 SOLUTION INTRAVENOUS SEE ADMIN INSTRUCTIONS
OUTPATIENT
Start: 2025-06-12

## 2025-06-12 RX ORDER — METOPROLOL SUCCINATE 50 MG/1
TABLET, EXTENDED RELEASE ORAL
COMMUNITY
Start: 2024-11-19

## 2025-06-12 ASSESSMENT — PAIN SCALES - GENERAL: PAINLEVEL_OUTOF10: NO PAIN (0)

## 2025-06-12 NOTE — PATIENT INSTRUCTIONS
You met with Dr. Aleksandr Garvey.      Today's visit instructions:    Reminder:  Surgery Requirements  Your surgery will be at [address] [place] [address].  You will need to arrive 2 hours early.  You will need someone to drive you home (over 18 years old) and stay with you for 24 hours after the procedure.  You will need a preop physical with [facility] ([facility]) within 30 days of surgery- closer is always better. We can try local anesthesia but need to plan for either deep sedation or a general anesthetic.  Stop any blood thinners, vitamins, minerals, or herbal supplements 5 days before surgery.  If you are taking a prescribed blood thinner or weight loss medication please let us know for specific instructions.  Fasting- a nurse from [facility] will call you 1-2 days before surgery to confirm your procedure and tell you when to stop eating and drinking. If you had you preoperative physical with the Anesthesia Team/P[facility], you do not get this call as it is discussed at the time of your visit.  Wash with Hibiclens (can be purchased at any pharmacy) or or you can stop at any [facility] and they will give you a bottle to use  the night before surgery and morning of surgery. See instructions in the Surgery Packet.  If you would like a procedure estimate please call Cost of Care at [phone] during the hours of 8 AM - 3 PM (option #2 for on-line request and option #3 for a representative).   Billing Customer Service:  [phone]    If you have questions please contact Leah RN or Clary RN during regular clinic hours, Monday through Friday 7:30 AM - 4:00 PM, or you can contact us via [employer] at anytime.       If you have urgent needs after-hours, weekends, or holidays please call the hospital at [phone] and ask to speak with our on-call General Surgery Team.    Appointment [phone]  Nurse Advice (Leah or Clary): [phone]   Surgery Scheduler (Mary Jane):  [phone]  Billing Customer Service:  [phone]  Fax: [phone]    After Your Nerve Biopsy      Incision care   You may take a shower the day after surgery. Carefully wash your incision with soap and water. Do not submerge yourself in water (bath, whirlpool, hot tub, pool, lake) for 14 days after surgery.   Remove the gauze bandage 1-2 days after surgery, but leave the medical tape (Steri-Strips) or glue in place. These will loosen and fall off on their own 1-2 weeks after surgery.  If a compression wrap (Ace) was used reapply as directed from discharge instructions.  Elevate the leg/arm as directed from discharge instructions.  Always wash your hands before touching your incisions or removing bandages.   It is not unusual to form a collection of fluid or blood under your incision that may feel firm or squishy- it can take several weeks to months for your body to reabsorb it.  At times, it may even drain.  If that should happen keep the area clean with soap, water,  and cover with a clean gauze dressing. You can change this daily or as needed.     Other medicines   Wait to start aspirin or blood thinners until the day after surgery. You can continue your regular medicines at your normal time the day after surgery.   Your pain medicine may cause constipation (hard, dry stools). To help with this, take the stool softener your doctor gave you or an over-the-counter stool softener or laxative. You can stop taking this when you are no longer taking pain medicine and your bowel movements are back to normal.      For pain or discomfort   Take the narcotic pain medicine your doctor gave you as needed and as instructed on the bottle. If you prefer to use over-the-counter medication, use acetaminophen (Tylenol) or ibuprofen (Advil, Motrin) as instructed on the box. Do not take Tylenol if it is in your narcotic pain medication.    Use an ice pack on your surgical cut (incision) for 20 minutes at a time as needed for  the first 24 hours. Be sure to protect your skin by putting a cloth between the ice pack and your skin.      Activities   No driving until you feel it s safe to do so. Don t drive while taking narcotic pain medicine.      Diet   You can eat your regular meals after surgery.      Results   You should know any test results about 7-14 business days after surgery from your referring provider.     When to call the doctor   Call your doctor if you have:   A fever above 101 F (38.3 C) (taken under the tongue), or a fever or chills lasting more than a day.   Redness at the incision site.   Any fluid or blood draining from the incision, especially if it smells bad.    Severe pain that doesn t improve with pain medicine.      We will call you 2 to 4 days after surgery to review this handout, answer questions and help arrange after-surgery care. If you have questions or concerns, please call [phone] during regular office hours. If you need to call after business hours, call [phone] and ask to page the surgeon on-call.

## 2025-06-12 NOTE — PROGRESS NOTES
Mirlande is a 89 year old who is being evaluated via a billable telephone visit.    What phone number would you like to be contacted at? [phone]  How would you like to obtain your AVS? MyChart  Originating Location (pt. Location): Home    Distant Location (provider location):  On-site  Telephone visit completed due to the patient did not have access to video, while the distant provider did.      Phone call duration: 19 minutes  Signed Electronically by: Aleksandr Garvey MD       Telephone call: 19 minutes.      The patient completed a telephone call visit as she was not able to coordinate a computer virtual visit.  I discussed the surgical procedure for sural nerve biopsy with the patient.  The patient is seen in the neurology clinic and has a request for a nerve biopsy to be completed in the next 2 weeks.  I discussed the risks and benefits of the procedure as well as the anesthetic used to perform nerve biopsy patient understood the risks and benefits which were discussed and included possible hematoma possible wound infection) possible persistent pain.  Patient understood these risks and wished to proceed        A/P: The patient is a 89-year-old woman who presents for a right sural nerve biopsy.  The patient will schedule at her soonest convenience.    Aleksandr Garvey MD, PhD

## 2025-06-12 NOTE — LETTER
6/12/2025       RE: Mirlande Simons  [address]  [facility] [address]     Dear Colleague,    Thank you for referring your patient, Mirlande Simons, to the [facility] [place] [facility]. Please see a copy of my visit note below.    Mirlande is a 89 year old who is being evaluated via a billable telephone visit.    What phone number would you like to be contacted at? [phone]  How would you like to obtain your AVS? MyChart  Originating Location (pt. Location): Home    Distant Location (provider location):  On-site  Telephone visit completed due to the patient did not have access to video, while the distant provider did.      Phone call duration: 19 minutes  Signed Electronically by: Aleksandr Garvey MD       Telephone call: 19 minutes.      The patient completed a telephone call visit as she was not able to coordinate a computer virtual visit.  I discussed the surgical procedure for sural nerve biopsy with the patient.  The patient is seen in the neurology clinic and has a request for a nerve biopsy to be completed in the next 2 weeks.  I discussed the risks and benefits of the procedure as well as the anesthetic used to perform nerve biopsy patient understood the risks and benefits which were discussed and included possible hematoma possible wound infection) possible persistent pain.  Patient understood these risks and wished to proceed        A/P: The patient is a 89-year-old woman who presents for a right sural nerve biopsy.  The patient will schedule at her soonest convenience.    Aleksandr Garvey MD, PhD    Again, thank you for allowing me to participate in the care of your patient.      Sincerely,    Aleksandr Garvey MD

## 2025-06-12 NOTE — NURSING NOTE
"Chief Complaint   Patient presents with    New Patient     Sural nerve biopsy       Vitals:    06/12/25 0656   Weight: 52.2 kg (115 lb)   Height: 1.702 m (5' 7\")       Body mass index is 18.01 kg/m .                          Milton Sexton, EMT    "

## 2025-06-14 ENCOUNTER — HEALTH MAINTENANCE LETTER (OUTPATIENT)
Age: OVER 89
End: 2025-06-14

## 2025-06-17 LAB — MAYO MISC RESULT: NORMAL

## 2025-06-17 NOTE — TELEPHONE ENCOUNTER
FUTURE VISIT INFORMATION      SURGERY INFORMATION:  Date: 6/26/25  Location: [facility] OR  Surgeon:  Aleksandr Garvey MD   Anesthesia Type:  Choice   Procedure: SURGICAL PROCUREMENT, NERVE, SURAL, RIGHT   Consult: 6/12/25    RECORDS REQUESTED FROM:       Primary Care Provider: Shana Gibson MD    Pertinent Medical History: Allergies: Minocycline     Most recent EKG+ Tracing: 3/18/22 @     Most recent ECHO: 5/30/25    Records Requested   June 17, 2025 10:07 AM   [facility]   Facility  [facility]   Outcome 10:09 am Sent request for EKG tracing. -LAURIE Latif on 6/17/2025 at 12:42 PM EKG tracing received & sent to HIM scanning. -LAURIE

## 2025-06-20 ENCOUNTER — PRE VISIT (OUTPATIENT)
Dept: SURGERY | Facility: CLINIC | Age: OVER 89
End: 2025-06-20

## 2025-06-20 ENCOUNTER — ANESTHESIA EVENT (OUTPATIENT)
Dept: SURGERY | Facility: CLINIC | Age: OVER 89
End: 2025-06-20
Payer: COMMERCIAL

## 2025-06-23 ENCOUNTER — TELEPHONE (OUTPATIENT)
Dept: NEUROLOGY | Facility: CLINIC | Age: OVER 89
End: 2025-06-23
Payer: COMMERCIAL

## 2025-06-23 NOTE — TELEPHONE ENCOUNTER
Patient confirmed scheduled appointment:  Date: 6/27/25  Time: 1:30 pm  Visit type: Return Subspecialty Neuropathy  Provider: Kitty  Location: [facility]  Testing/imaging: na  Additional notes: follow up    Scheduled per  - You can offer 6/27 at 1:30PM. You will have to double book my schedule.   Please also let her know that that is the last day I am working before maternity leave.

## 2025-06-26 ENCOUNTER — ANESTHESIA (OUTPATIENT)
Dept: SURGERY | Facility: CLINIC | Age: OVER 89
End: 2025-06-26
Payer: COMMERCIAL

## 2025-06-26 ENCOUNTER — HOSPITAL ENCOUNTER (OUTPATIENT)
Facility: CLINIC | Age: OVER 89
Discharge: HOME | End: 2025-06-26
Attending: SURGERY | Admitting: SURGERY
Payer: COMMERCIAL

## 2025-06-26 VITALS
RESPIRATION RATE: 16 BRPM | WEIGHT: 120.59 LBS | SYSTOLIC BLOOD PRESSURE: 168 MMHG | HEART RATE: 59 BPM | TEMPERATURE: 97.2 F | BODY MASS INDEX: 18.93 KG/M2 | DIASTOLIC BLOOD PRESSURE: 75 MMHG | OXYGEN SATURATION: 98 % | HEIGHT: 67 IN

## 2025-06-26 DIAGNOSIS — G58.7 MONONEURITIS MULTIPLEX: Primary | ICD-10-CM

## 2025-06-26 PROCEDURE — 88341 IMHCHEM/IMCYTCHM EA ADD ANTB: CPT | Performed by: SURGERY

## 2025-06-26 PROCEDURE — C1762 CONN TISS, HUMAN(INC FASCIA): HCPCS | Performed by: SURGERY

## 2025-06-26 PROCEDURE — 370N000017 HC ANESTHESIA TECHNICAL FEE, PER MIN: Performed by: SURGERY

## 2025-06-26 PROCEDURE — 250N000013 HC RX MED GY IP 250 OP 250 PS 637

## 2025-06-26 PROCEDURE — 88305 TISSUE EXAM BY PATHOLOGIST: CPT | Performed by: SURGERY

## 2025-06-26 PROCEDURE — 250N000011 HC RX IP 250 OP 636: Performed by: SURGERY

## 2025-06-26 PROCEDURE — 64893 NRV GRF 1STRND ARM/LEG >4 CM: CPT | Mod: 59 | Performed by: SURGERY

## 2025-06-26 PROCEDURE — 710N000011 HC RECOVERY PHASE 1, LEVEL 3, PER MIN: Performed by: SURGERY

## 2025-06-26 PROCEDURE — 360N000076 HC SURGERY LEVEL 3, PER MIN: Performed by: SURGERY

## 2025-06-26 PROCEDURE — 250N000009 HC RX 250: Performed by: NURSE ANESTHETIST, CERTIFIED REGISTERED

## 2025-06-26 PROCEDURE — 64795 BIOPSY OF NERVE: CPT | Mod: RT | Performed by: SURGERY

## 2025-06-26 PROCEDURE — 88313 SPECIAL STAINS GROUP 2: CPT | Performed by: SURGERY

## 2025-06-26 PROCEDURE — 88342 IMHCHEM/IMCYTCHM 1ST ANTB: CPT | Performed by: SURGERY

## 2025-06-26 PROCEDURE — 250N000009 HC RX 250: Performed by: SURGERY

## 2025-06-26 PROCEDURE — 258N000003 HC RX IP 258 OP 636: Performed by: NURSE ANESTHETIST, CERTIFIED REGISTERED

## 2025-06-26 PROCEDURE — 250N000011 HC RX IP 250 OP 636: Performed by: NURSE ANESTHETIST, CERTIFIED REGISTERED

## 2025-06-26 PROCEDURE — 710N000012 HC RECOVERY PHASE 2, PER MINUTE: Performed by: SURGERY

## 2025-06-26 PROCEDURE — 999N000141 HC STATISTIC PRE-PROCEDURE NURSING ASSESSMENT: Performed by: SURGERY

## 2025-06-26 PROCEDURE — C1763 CONN TISS, NON-HUMAN: HCPCS | Performed by: SURGERY

## 2025-06-26 PROCEDURE — 272N000001 HC OR GENERAL SUPPLY STERILE: Performed by: SURGERY

## 2025-06-26 DEVICE — IMPLANTABLE DEVICE: Type: IMPLANTABLE DEVICE | Site: LEG | Status: FUNCTIONAL

## 2025-06-26 RX ORDER — ONDANSETRON 2 MG/ML
INJECTION INTRAMUSCULAR; INTRAVENOUS PRN
Status: DISCONTINUED | OUTPATIENT
Start: 2025-06-26 | End: 2025-06-26

## 2025-06-26 RX ORDER — ONDANSETRON 4 MG/1
4 TABLET, ORALLY DISINTEGRATING ORAL EVERY 30 MIN PRN
Status: DISCONTINUED | OUTPATIENT
Start: 2025-06-26 | End: 2025-06-26 | Stop reason: HOSPADM

## 2025-06-26 RX ORDER — OXYCODONE HYDROCHLORIDE 5 MG/1
5 TABLET ORAL
Refills: 0 | Status: CANCELLED | OUTPATIENT
Start: 2025-06-26

## 2025-06-26 RX ORDER — HYDROXYZINE HYDROCHLORIDE 10 MG/1
10 TABLET, FILM COATED ORAL
Status: CANCELLED | OUTPATIENT
Start: 2025-06-26

## 2025-06-26 RX ORDER — LABETALOL HYDROCHLORIDE 5 MG/ML
10 INJECTION, SOLUTION INTRAVENOUS
Status: DISCONTINUED | OUTPATIENT
Start: 2025-06-26 | End: 2025-06-26 | Stop reason: HOSPADM

## 2025-06-26 RX ORDER — HYDRALAZINE HYDROCHLORIDE 20 MG/ML
2.5-5 INJECTION INTRAMUSCULAR; INTRAVENOUS EVERY 10 MIN PRN
Status: DISCONTINUED | OUTPATIENT
Start: 2025-06-26 | End: 2025-06-26 | Stop reason: HOSPADM

## 2025-06-26 RX ORDER — PROPOFOL 10 MG/ML
INJECTION, EMULSION INTRAVENOUS CONTINUOUS PRN
Status: DISCONTINUED | OUTPATIENT
Start: 2025-06-26 | End: 2025-06-26

## 2025-06-26 RX ORDER — ONDANSETRON 2 MG/ML
4 INJECTION INTRAMUSCULAR; INTRAVENOUS EVERY 30 MIN PRN
Status: DISCONTINUED | OUTPATIENT
Start: 2025-06-26 | End: 2025-06-26 | Stop reason: HOSPADM

## 2025-06-26 RX ORDER — AMOXICILLIN 250 MG
1-2 CAPSULE ORAL 2 TIMES DAILY
Qty: 30 TABLET | Refills: 0 | Status: SHIPPED | OUTPATIENT
Start: 2025-06-26

## 2025-06-26 RX ORDER — PROPOFOL 10 MG/ML
INJECTION, EMULSION INTRAVENOUS PRN
Status: DISCONTINUED | OUTPATIENT
Start: 2025-06-26 | End: 2025-06-26

## 2025-06-26 RX ORDER — ONDANSETRON 2 MG/ML
4 INJECTION INTRAMUSCULAR; INTRAVENOUS EVERY 6 HOURS PRN
Status: CANCELLED | OUTPATIENT
Start: 2025-06-26

## 2025-06-26 RX ORDER — DEXAMETHASONE SODIUM PHOSPHATE 4 MG/ML
INJECTION, SOLUTION INTRA-ARTICULAR; INTRALESIONAL; INTRAMUSCULAR; INTRAVENOUS; SOFT TISSUE PRN
Status: DISCONTINUED | OUTPATIENT
Start: 2025-06-26 | End: 2025-06-26

## 2025-06-26 RX ORDER — OXYCODONE HYDROCHLORIDE 5 MG/1
5-10 TABLET ORAL EVERY 4 HOURS PRN
Qty: 6 TABLET | Refills: 0 | Status: CANCELLED | OUTPATIENT
Start: 2025-06-26

## 2025-06-26 RX ORDER — NALOXONE HYDROCHLORIDE 0.4 MG/ML
0.1 INJECTION, SOLUTION INTRAMUSCULAR; INTRAVENOUS; SUBCUTANEOUS
Status: DISCONTINUED | OUTPATIENT
Start: 2025-06-26 | End: 2025-06-26 | Stop reason: HOSPADM

## 2025-06-26 RX ORDER — ONDANSETRON 4 MG/1
4 TABLET, ORALLY DISINTEGRATING ORAL EVERY 6 HOURS PRN
Status: CANCELLED | OUTPATIENT
Start: 2025-06-26

## 2025-06-26 RX ORDER — DEXAMETHASONE SODIUM PHOSPHATE 4 MG/ML
4 INJECTION, SOLUTION INTRA-ARTICULAR; INTRALESIONAL; INTRAMUSCULAR; INTRAVENOUS; SOFT TISSUE
Status: DISCONTINUED | OUTPATIENT
Start: 2025-06-26 | End: 2025-06-26 | Stop reason: HOSPADM

## 2025-06-26 RX ORDER — SODIUM CHLORIDE, SODIUM LACTATE, POTASSIUM CHLORIDE, CALCIUM CHLORIDE 600; 310; 30; 20 MG/100ML; MG/100ML; MG/100ML; MG/100ML
INJECTION, SOLUTION INTRAVENOUS CONTINUOUS
Status: DISCONTINUED | OUTPATIENT
Start: 2025-06-26 | End: 2025-06-26 | Stop reason: HOSPADM

## 2025-06-26 RX ORDER — HYDRALAZINE HYDROCHLORIDE 25 MG/1
25 TABLET, FILM COATED ORAL ONCE
Status: COMPLETED | OUTPATIENT
Start: 2025-06-26 | End: 2025-06-26

## 2025-06-26 RX ORDER — OXYCODONE HYDROCHLORIDE 5 MG/1
2.5-5 TABLET ORAL EVERY 6 HOURS PRN
Qty: 6 TABLET | Refills: 0 | Status: SHIPPED | OUTPATIENT
Start: 2025-06-26 | End: 2025-06-28

## 2025-06-26 RX ORDER — ACETAMINOPHEN 325 MG/1
650 TABLET ORAL EVERY 4 HOURS PRN
Qty: 50 TABLET | Refills: 0 | Status: SHIPPED | OUTPATIENT
Start: 2025-06-26

## 2025-06-26 RX ORDER — SODIUM CHLORIDE, SODIUM LACTATE, POTASSIUM CHLORIDE, CALCIUM CHLORIDE 600; 310; 30; 20 MG/100ML; MG/100ML; MG/100ML; MG/100ML
INJECTION, SOLUTION INTRAVENOUS CONTINUOUS PRN
Status: DISCONTINUED | OUTPATIENT
Start: 2025-06-26 | End: 2025-06-26

## 2025-06-26 RX ORDER — OXYCODONE HYDROCHLORIDE 5 MG/1
5 TABLET ORAL
Status: DISCONTINUED | OUTPATIENT
Start: 2025-06-26 | End: 2025-06-26 | Stop reason: HOSPADM

## 2025-06-26 RX ORDER — METOPROLOL TARTRATE 25 MG/1
25 TABLET, FILM COATED ORAL ONCE
Status: COMPLETED | OUTPATIENT
Start: 2025-06-26 | End: 2025-06-26

## 2025-06-26 RX ORDER — CEFAZOLIN SODIUM/WATER 2 G/20 ML
2 SYRINGE (ML) INTRAVENOUS SEE ADMIN INSTRUCTIONS
Status: DISCONTINUED | OUTPATIENT
Start: 2025-06-26 | End: 2025-06-26 | Stop reason: HOSPADM

## 2025-06-26 RX ORDER — FENTANYL CITRATE 50 UG/ML
INJECTION, SOLUTION INTRAMUSCULAR; INTRAVENOUS PRN
Status: DISCONTINUED | OUTPATIENT
Start: 2025-06-26 | End: 2025-06-26

## 2025-06-26 RX ORDER — CEFAZOLIN SODIUM/WATER 2 G/20 ML
2 SYRINGE (ML) INTRAVENOUS
Status: COMPLETED | OUTPATIENT
Start: 2025-06-26 | End: 2025-06-26

## 2025-06-26 RX ORDER — LIDOCAINE HYDROCHLORIDE 20 MG/ML
INJECTION, SOLUTION INFILTRATION; PERINEURAL PRN
Status: DISCONTINUED | OUTPATIENT
Start: 2025-06-26 | End: 2025-06-26

## 2025-06-26 RX ORDER — OXYCODONE HYDROCHLORIDE 10 MG/1
10 TABLET ORAL
Status: DISCONTINUED | OUTPATIENT
Start: 2025-06-26 | End: 2025-06-26 | Stop reason: HOSPADM

## 2025-06-26 RX ORDER — ACETAMINOPHEN 325 MG/1
975 TABLET ORAL ONCE
Status: COMPLETED | OUTPATIENT
Start: 2025-06-26 | End: 2025-06-26

## 2025-06-26 RX ADMIN — SODIUM CHLORIDE, SODIUM LACTATE, POTASSIUM CHLORIDE, AND CALCIUM CHLORIDE: .6; .31; .03; .02 INJECTION, SOLUTION INTRAVENOUS at 15:46

## 2025-06-26 RX ADMIN — Medication 50 MG: at 16:13

## 2025-06-26 RX ADMIN — HYDRALAZINE HYDROCHLORIDE 25 MG: 25 TABLET ORAL at 20:44

## 2025-06-26 RX ADMIN — SODIUM CHLORIDE, SODIUM LACTATE, POTASSIUM CHLORIDE, AND CALCIUM CHLORIDE: .6; .31; .03; .02 INJECTION, SOLUTION INTRAVENOUS at 13:39

## 2025-06-26 RX ADMIN — Medication 2 G: at 13:50

## 2025-06-26 RX ADMIN — LIDOCAINE HYDROCHLORIDE 60 MG: 20 INJECTION, SOLUTION INFILTRATION; PERINEURAL at 13:51

## 2025-06-26 RX ADMIN — PHENYLEPHRINE HYDROCHLORIDE 100 MCG: 10 INJECTION INTRAVENOUS at 14:58

## 2025-06-26 RX ADMIN — PHENYLEPHRINE HYDROCHLORIDE 100 MCG: 10 INJECTION INTRAVENOUS at 13:55

## 2025-06-26 RX ADMIN — Medication 40 MG: at 13:53

## 2025-06-26 RX ADMIN — FENTANYL CITRATE 50 MCG: 50 INJECTION INTRAMUSCULAR; INTRAVENOUS at 15:43

## 2025-06-26 RX ADMIN — PHENYLEPHRINE HYDROCHLORIDE 100 MCG: 10 INJECTION INTRAVENOUS at 14:10

## 2025-06-26 RX ADMIN — ONDANSETRON 4 MG: 2 INJECTION INTRAMUSCULAR; INTRAVENOUS at 15:49

## 2025-06-26 RX ADMIN — METOPROLOL TARTRATE 25 MG: 25 TABLET, FILM COATED ORAL at 19:24

## 2025-06-26 RX ADMIN — PROPOFOL 120 MG: 10 INJECTION, EMULSION INTRAVENOUS at 13:52

## 2025-06-26 RX ADMIN — FENTANYL CITRATE 50 MCG: 50 INJECTION INTRAMUSCULAR; INTRAVENOUS at 13:51

## 2025-06-26 RX ADMIN — PROPOFOL 30 MG: 10 INJECTION, EMULSION INTRAVENOUS at 15:43

## 2025-06-26 RX ADMIN — ACETAMINOPHEN 975 MG: 325 TABLET ORAL at 17:31

## 2025-06-26 RX ADMIN — PROPOFOL 150 MCG/KG/MIN: 10 INJECTION, EMULSION INTRAVENOUS at 13:54

## 2025-06-26 RX ADMIN — DEXAMETHASONE SODIUM PHOSPHATE 4 MG: 4 INJECTION, SOLUTION INTRAMUSCULAR; INTRAVENOUS at 14:35

## 2025-06-26 ASSESSMENT — ACTIVITIES OF DAILY LIVING (ADL)
ADLS_ACUITY_SCORE: 41
ADLS_ACUITY_SCORE: 38
ADLS_ACUITY_SCORE: 41

## 2025-06-26 NOTE — DISCHARGE INSTRUCTIONS
Contacting your Doctor -   To contact a doctor, call Dr. Garvey at General surgery clinic at [phone]  or:  [phone] and ask for the resident on call for Surgery (answered 24 hours a day)   Emergency Department:  [facility]: [phone] 911 if you are in need of immediate or emergent help

## 2025-06-26 NOTE — OP NOTE
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       Surgeon:         Surgeons and Role:     * Aleksandr Garvey MD - Primary     * Juan Portillo - Resident - Assisting     * Irene Romero MD - Resident - Assisting  Anesthesia:     Choice   Estimated Blood Loss: 20 cc     Drains: None  Specimens:       ID Type Source Tests Collected by Time Destination   1 : right sural nerve Tissue Other SURGICAL PATHOLOGY EXAM Aleksandr Garvey MD 6/26/2025  2:51 PM     2 : right sural nerve Tissue Nerve NERVE BIOPSY (REFERENCE LABORATORY) Aleksandr Garvey MD 6/26/2025  2:55 PM        Findings:                     Sural nerve identified prior to start of procedure with ultrasound. Right leg prepped circumferentially and draped with extremity drape.  Incision made with 10 blade, carried down to nerve without cautery. Nerve identified just posterior to short saphenous vein.  This was dissected with combination of blunt and sharp instrumentation. Nerve biopsy sample (~ 3cm) taken sharply with mets. Nerve graft then brought onto the field. Excess length from proximal end was excised. Proximal end sutured to nerve graft at 3 points with 7-0 PDS. Excess nerve graft excised from distal end, also sutured with 3 points of 7-0 PDS. Graft wrapped in collagen wrapping to protect anastamoses and secured with clips. Closed with interrupted deep dermal stitches of 3-0 vicryl. Skin closed with running subcuticular and dressed with dermabond.     Complications:            None.  Implants:           Implant Name Type Inv. Item Serial No.  Lot No. LRB No. Used Action   NERVE GRAFT AVANCE [id number] - [id number] Bone/Tissue/Biologic NERVE GRAFT AVANCE [id number]   AXOGEN [id number] Right 1  Implanted   Axuard nerve protector         [id number]          Clinical History    Patient is a 89-year-old woman with the onset of right foot drop and muscular spasms of the right leg. The patient was evaluated in the Neurology Clinic and a right whole sural nerve biopsy was requested. The patient understood the risks and benefits of surgery the potential for injury to associated nerve, artery, and vein, the potential for bleeding, hematoma, the potential for infection, and all the anesthetic complications possible following anesthesia, including myocardial infarction, pulmonary embolus, stroke, and mehran death in the OR; the patient understood these risks and she wish to proceed.    Procedure.     Patient was taken to the main operating room under General anesthetic, the patient was placed in the prone position with appropriate cushioning on all soft tissue surfaces. The right leg was prepped and draped in a sterile fashion. The ultrasound was used both before and after incision to identify nerve and vascular structures of the right leg. And linear longitudinal incision was made along a midpoint between the lateral malleolus and the Achilles tendon. This incision was taken down soft tissue with the Metzenbaum scissor to identify a neurovascular bundle. A linear structure associated with the neurovascular bundle was identified as likely to be the sural nerve. The nerve was dissected, proximally and distally using the Metzenbaum scissor. The nerve was  from the remainder of the neurovascular bundle, and approximately 4 cm of nerve was excised using the number 11 blade proximally and distally. The nerve was placed, according to the nerve laboratory protocol, in glutaraldehyde fixative for EM and 10% buffered formalin for standard microscopy and submitted to the core laboratory for . The Nerve Pathology office was contacted by telephone to confirm that the nerve biopsy was available for .  Attention was then directed to placing an interposition nerve graph between the proximal and distal portions of the sural nerve. The nerve graft was sutured in place with 7-0 PDS suture. The nerve graft was protected with a nerve protector sheath that was secured with small surgical clips. Hemostasis was achieved using electrocautery The wound was irrigated with sterile normal saline. The skin was re-approximated with interrupted 3-0 Vicryl sutures, running 4-0 subcuticular Monocryl  suture, and skin sealant. Patient tolerated the procedure well. Needle and sponge counts were correct times 2 and the patient was returned to the postoperative recovery unit in good condition.

## 2025-06-26 NOTE — BRIEF OP NOTE
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]  Post-operative diagnosis Same as pre-operative diagnosis    Procedure: SURGICAL PROCUREMENT, NERVE, SURAL, RIGHT, Right - Update    Surgeon: Surgeons and Role:     * Aleksandr Garvey MD - Primary     * Juan Portillo - Resident - Assisting     * Irene Romero MD - Resident - Assisting  Anesthesia: Choice   Estimated Blood Loss: 20 cc    Drains: None  Specimens:   ID Type Source Tests Collected by Time Destination   1 : right sural nerve Tissue Other SURGICAL PATHOLOGY EXAM Aleksandr Garvey MD 6/26/2025  2:51 PM    2 : right sural nerve Tissue Nerve NERVE BIOPSY (REFERENCE LABORATORY) Aleksandr Garvey MD 6/26/2025  2:55 PM      Findings:   Sural nerve identified prior to start of procedure with ultrasound. Right leg prepped circumferentially and draped with extremity drape.  Incision made with 10 blade, carried down to nerve without cautery. Nerve identified just posterior to short saphenous vein.  This was dissected with combination of blunt and sharp instrumentation. Nerve biopsy sample (~ 3cm) taken sharply with mets. Nerve graft then brought onto the field. Excess length from proximal end was excised. Proximal end sutured to nerve graft at 3 points with 7-0 PDS. Excess nerve graft excised from distal end, also sutured with 3 points of 7-0 PDS. Graft wrapped in collagen wrapping to protect anastamoses and secured with clips. Closed with interrupted deep dermal stitches of 3-0 vicryl. Skin closed with running subcuticular and dressed with dermabond.    Complications: None.  Implants:   Implant Name Type Inv. Item Serial No.  Lot No. LRB No. Used Action   NERVE GRAFT AVANCE [id number] - [id number] Bone/Tissue/Biologic NERVE GRAFT AVANCE [id number]  AXOGEN [id number] Right 1 Implanted   Axoguard nerve protector     [id number] Right 1  Implanted

## 2025-06-26 NOTE — ANESTHESIA CARE TRANSFER NOTE
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    Anesthesia Type:   General     Note:    Oropharynx: oropharynx clear of all foreign objects  Level of Consciousness: awake  Oxygen Supplementation: nasal cannula  Level of Supplemental Oxygen (L/min / FiO2): 3 LPM  Independent Airway: airway patency satisfactory and stable  Dentition: dentition unchanged  Vital Signs Stable: post-procedure vital signs reviewed and stable  Report to RN Given: handoff report given  Patient transferred to: PACU    Handoff Report: Identifed the Patient, Identified the Reponsible Provider, Reviewed the pertinent medical history, Discussed the surgical course, Reviewed Intra-OP anesthesia mangement and issues during anesthesia, Set expectations for post-procedure period and Allowed opportunity for questions and acknowledgement of understanding      Vitals:  Vitals Value Taken Time   /64 06/26/25 16:30   Temp     Pulse 61 06/26/25 16:34   Resp 13 06/26/25 16:34   SpO2 98 % 06/26/25 16:34   Vitals shown include unfiled device data.    Electronically Signed By: RANDOLPH Weaver CRNA  June 26, 2025  4:34 PM

## 2025-06-26 NOTE — ANESTHESIA POSTPROCEDURE EVALUATION
Patient: Mirlande Simons    Procedure: Procedure(s):  SURGICAL PROCUREMENT, NERVE, SURAL, RIGHT       Anesthesia Type:  General    Note:  Disposition: Outpatient   Postop Pain Control: Uneventful            Sign Out: Well controlled pain   PONV: No   Neuro/Psych: Uneventful            Sign Out: Acceptable/Baseline neuro status   Airway/Respiratory: Uneventful            Sign Out: Acceptable/Baseline resp. status   CV/Hemodynamics: Uneventful            Sign Out: Acceptable CV status; No obvious hypovolemia; No obvious fluid overload   Other NRE: NONE   DID A NON-ROUTINE EVENT OCCUR? No           Last vitals:  Vitals Value Taken Time   /67 06/26/25 17:15   Temp 36.5  C (97.7  F) 06/26/25 16:30   Pulse 58 06/26/25 17:27   Resp 11 06/26/25 17:27   SpO2 98 % 06/26/25 17:27   Vitals shown include unfiled device data.    Electronically Signed By: Eden Major MD  June 26, 2025  5:28 PM

## 2025-06-26 NOTE — ANESTHESIA PREPROCEDURE EVALUATION
Anesthesia Pre-Procedure Evaluation    Patient: Mirlande Simons   MRN: [id number] : 1935          Procedure : Procedure(s):  SURGICAL PROCUREMENT, NERVE, SURAL, RIGHT         Past Medical History:   Diagnosis Date    H/O melanoma excision     HTN (hypertension)     Lumbar spondylosis     Osteoporosis     Prediabetes     Scoliosis       Past Surgical History:   Procedure Laterality Date    IR LUMBAR PUNCTURE  2019    right TKA            Allergies   Allergen Reactions    Minocycline Rash and Other (See Comments)     History of nodules on legs and diffuse rash on body.    Minocycline toxicity, Rash    Minocycline toxicity, Rash      History of nodules on legs and diffuse rash on body.      Social History     Tobacco Use    Smoking status: Former     Types: Cigarettes    Smokeless tobacco: Never   Substance Use Topics    Alcohol use: Yes     Comment: 1 drink night      Wt Readings from Last 1 Encounters:   25 54.7 kg (120 lb 9.5 oz)        Anesthesia Evaluation   Pt has had prior anesthetic. Type: General.        ROS/MED HX  ENT/Pulmonary:  - neg pulmonary ROS     Neurologic: Comment: RLE weakness, numbness, foot drop.      Cardiovascular:     (+)  hypertension- -   -  - -                                      METS/Exercise Tolerance:     Hematologic:  - neg hematologic  ROS     Musculoskeletal:  - neg musculoskeletal ROS     GI/Hepatic:  - neg GI/hepatic ROS     Renal/Genitourinary:  - neg Renal ROS     Endo:  - neg endo ROS     Psychiatric/Substance Use:  - neg psychiatric ROS     Infectious Disease:  - neg infectious disease ROS     Malignancy:  - neg malignancy ROS     Other:              Physical Exam  Airway  Mallampati: II  TM distance: >3 FB  Neck ROM: limited  Upper bite lip test: II  Mouth opening: < 4 cm    Cardiovascular - normal exam   Dental     Pulmonary - normal exam      Neurological   She appears awake, alert and oriented x3.    Other Findings       OUTSIDE LABS:  CBC:   Lab  "Results   Component Value Date    WBC 7.1 06/09/2025    HGB 12.0 06/09/2025    HCT 37.8 06/09/2025     06/09/2025     BMP:   Lab Results   Component Value Date     06/09/2025    POTASSIUM 3.7 06/09/2025    CHLORIDE 103 06/09/2025    CO2 26 06/09/2025    BUN 17.7 06/09/2025    CR 0.79 06/09/2025    GLC 98 06/09/2025     COAGS: No results found for: \"PTT\", \"INR\", \"FIBR\"  POC: No results found for: \"BGM\", \"HCG\", \"HCGS\"  HEPATIC:   Lab Results   Component Value Date    ALBUMIN 4.0 06/09/2025    PROTTOTAL 6.7 06/09/2025    ALT 70 (H) 06/09/2025    AST 59 (H) 06/09/2025    ALKPHOS 114 06/09/2025    BILITOTAL 0.5 06/09/2025     OTHER:   Lab Results   Component Value Date    A1C 5.7 (H) 06/09/2025    JAYRO 9.0 06/09/2025    SED 15 06/09/2025       Anesthesia Plan    ASA Status:  2       Anesthesia Type: General.  Airway: oral.  Maintenance: TIVA.   Techniques and Equipment:       - Monitoring Plan: standard ASA monitoring     Consents    Anesthesia Plan(s) and associated risks, benefits, and realistic alternatives discussed. Questions answered and patient/representative(s) expressed understanding.     - Discussed: CRNA     - Discussed with:  Patient        - Pt is DNR/DNI Status: no DNR     Blood Consent:      - Discussed with: patient.     Postoperative Care    Pain management: plan for postoperative opioid use.     Comments:    Other Comments: The patient will be positioned prone for this procedure. Will proceed with GETA and TIVA. Potential risks and complications of GETA discussed with the patient and family member, they agree with the plan.               PAC Discussion and Assessment    ASA Classification: 2    Anesthetic techniques and relevant risks discussed: GA  Invasive monitoring and risk discussed: No    Possibility and Risk of blood transfusion discussed: No  NPO instructions given: NPO after midnight    Needs early admission to pre-op area: No                                            Karmen " MD Jewel    I have reviewed the pertinent notes and labs in the chart from the past 30 days and (re)examined the patient.  Any updates or changes from those notes are reflected in this note.    Clinically Significant Risk Factors Present on Admission                   # Hypertension: Home medication list includes antihypertensive(s)

## 2025-06-26 NOTE — ANESTHESIA PROCEDURE NOTES
Airway       Patient location during procedure: OR       Procedure Start/Stop Times: 6/26/2025 1:51 PM and 6/26/2025 1:54 PM  Staff -        CRNA: Reggie Reardon APRN CRNA       Performed By: CRNA  Consent for Airway        Urgency: elective  Indications and Patient Condition       Indications for airway management: masood-procedural       Induction type:intravenous       Mask difficulty assessment: 1 - vent by mask    Final Airway Details       Final airway type: endotracheal airway       Successful airway: ETT - single  Endotracheal Airway Details        ETT size (mm): 7.0       Cuffed: yes       Successful intubation technique: direct laryngoscopy       DL Blade Type: MAC 3       Grade View of Cords: 1       Adjucts: stylet       Position: Right       Measured from: gums/teeth       Secured at (cm): 21       Bite block used: Soft    Post intubation assessment        Placement verified by: capnometry, equal breath sounds and chest rise        Number of attempts at approach: 1       Number of other approaches attempted: 0       Secured with: tape       Ease of procedure: easy       Dentition: Intact    Medication(s) Administered   Medication Administration Time: 6/26/2025 1:51 PM

## 2025-06-30 ENCOUNTER — PATIENT OUTREACH (OUTPATIENT)
Dept: SURGERY | Facility: CLINIC | Age: OVER 89
End: 2025-06-30
Payer: COMMERCIAL

## 2025-06-30 ENCOUNTER — PATIENT OUTREACH (OUTPATIENT)
Dept: CARE COORDINATION | Facility: CLINIC | Age: OVER 89
End: 2025-06-30
Payer: COMMERCIAL

## 2025-06-30 ENCOUNTER — TELEPHONE (OUTPATIENT)
Dept: NEUROLOGY | Facility: CLINIC | Age: OVER 89
End: 2025-06-30
Payer: COMMERCIAL

## 2025-06-30 NOTE — PROGRESS NOTES
"RN Post-Op/Post-Discharge Care Coordination Note    Ms. Mirlande Simons is a 89 year old female who underwent nerve biopsy on 6/26 with  Dr. Aleksandr Garvey.  Spoke with Patient.    Support  Patient able to care for self independently     Health Status  Nausea/Vomiting: Patient denies nausea/vomiting.  Eating/drinking: Patient is able to eat and drink without any complaints.  Diet:  Regular  Bowel habits: Patient reports constipation intermittently and Dr. Garvey has recommended that she eat \"7 prunes\".  Drains (ILENE): N/A  Fevers/chills: Patient denies any fever or chills.  Incisions: Patient denies any signs and symptoms of infection..  Wound closure:  Skin Sealant  Pain: Persists and she is medicating with Tylenol PRN per package instructions, elevating her leg and applying local heat (protecting her skin from injury).  Dr. Garvey also has called and spoken to the patient.  New Medications:  Oxycodone and Tylenol    Activity/Restrictions  Discussed importance of early mobility after surgery. Suggested getting up and walking and at a minimum walking room to room every hour.    Adherence of the following restrictions:   No restrictions    Equipment  None    Pathology reviewed with patient:  No, not yet available    Forms/Letters  No    All of her questions were answered including reviewing restrictions and wound care.  She will call this office if she has any further questions and/or concerns.      PO appointment arranged 7/14 at 0700 per the request of Dr. Garvey.    Whom and When to Call  Patient acknowledges understanding of how to manage any medication changes and   when to seek medical care.     Patient advised that if after hour medical concerns arise to please call [phone] and choose option 4 to speak to the physician on call.              "

## 2025-06-30 NOTE — TELEPHONE ENCOUNTER
Patient Contacted for the patient to call back and schedule the following:    Appointment type: Return Subspecialty Neuropathy  Provider: Fito (From [place] [facility] gone)  Return date: See below  Specialty phone number: [phone]  Additional appointment(s) needed:   Additonal Notes:      Offered TATO slot  9/8/25 8 am but stated too early. Messaging care team to see about later time TATO.

## 2025-07-09 ENCOUNTER — OFFICE VISIT (OUTPATIENT)
Dept: NEUROLOGY | Facility: CLINIC | Age: OVER 89
End: 2025-07-09
Payer: COMMERCIAL

## 2025-07-09 DIAGNOSIS — I77.6 VASCULITIC NEUROPATHY: Primary | ICD-10-CM

## 2025-07-09 DIAGNOSIS — G63 VASCULITIC NEUROPATHY: Primary | ICD-10-CM

## 2025-07-09 NOTE — PROGRESS NOTES
[facility]   NEUROMUSCULAR PATHOLOGY REPORT     NEUROMUSCULAR LABORATORY [phone] / [phone]  [address],  [place] [address]     NERVE BIOPSY REPORT  NAME: Mirlande Simons  MR#: [id number]  : 1984  DATE OF BIOPSY: 2025  DATE OF REPORT: 2025  SPECIMEN NO:   SURGEON: Aleksandr Garvey MD  REFERRING PHYSICIAN: Kitty    CLINICAL INFORMATION:    This 89 year old woman had a right sural nerve biopsy performed to evaluate for the possibility of (micro-)vasculitic neuropathy. She fell in 2025 and experienced back pain but was able to walk shortly after the fall. In 2025 she developed rapidly progressive numbness and weakness of the right leg resulting in a foot drop. EMG studies showed an axonal multifocal neuropathy, CRP was elevated but other autoimmune markers were negative.    RIGHT SURAL NERVE NERVE BIOPSY:    One segment of right sural nerve was submitted for paraffin embedding for light microscopy and special staining. A second segment of nerve was fixed in 2.5% glutaraldehyde/paraformaldehyde for plastic embedding.    LIGHT MICROSCOPY:    Sections cut from paraffin-embedded material were stained with H&E, Jhony Trichrome, LFB/PAS, and Congo red. One-micron sections were cut from plastic embedded nerve and stained with toluidine blue. The myelinated fiber density was severely reduced in all fascicles with minimal variation between fascicles. There were a few thinly myelinated fibers but no onion bulbs. There were occasional degenerating fibers in each fascicle. Regenerative clusters were very rare to absent. Perineurium showed focal swelling and lymphocytic cellularity in one area suggesting injury neuroma. The subperineurial and interstitial spaces were normal. Congo red stain was negative for amyloid deposits. H&E preparation showed several epineurial blood vessels with prominent perivascular lymphocytic inflammation, though not  transmural inflammation. Fibrinoid necrosis and vessel occlusions were not present.    IMMUNOHISTOCHEMISTRY:    Specific markers for T-lymphocytes (CD3, CD4, CD8), B-lymphocytes (CD20), and macrophages (CD68) were applied. T-cell specific stains showed several large perivascular collections of T-lymphocytes in the epineurium, staining with CD4>CD8 and CD3. B-cell stain was negative. CD68 showed frequent endoneurial macrophages in a pattern characteristic of Wallerian degeneration.    DIAGNOSIS:    Microvasculitic neuropathy with severe axonal loss.    Krishna Payton MD, FAAN  Clinical Professor of Neurology

## 2025-07-09 NOTE — LETTER
2025       RE: Mirlande Simons  [address]  [facility] [address]     Dear Colleague,    Thank you for referring your patient, Mirlande Simons, to the [facility]. Please see a copy of my visit note below.    [facility]   NEUROMUSCULAR PATHOLOGY REPORT     NEUROMUSCULAR LABORATORY [phone] / [phone]  [address],  [place] [address]     NERVE BIOPSY REPORT  NAME: Mirlande Simons  MR#: [id number]  : 1984  DATE OF BIOPSY: 2025  DATE OF REPORT: 2025  SPECIMEN NO:   SURGEON: Aleksandr Garvey MD  REFERRING PHYSICIAN: Kitty    CLINICAL INFORMATION:    This 89 year old woman had a right sural nerve biopsy performed to evaluate for the possibility of (micro-)vasculitic neuropathy. She fell in 2025 and experienced back pain but was able to walk shortly after the fall. In 2025 she developed rapidly progressive numbness and weakness of the right leg resulting in a foot drop. EMG studies showed an axonal multifocal neuropathy, CRP was elevated but other autoimmune markers were negative.    RIGHT SURAL NERVE NERVE BIOPSY:    One segment of right sural nerve was submitted for paraffin embedding for light microscopy and special staining. A second segment of nerve was fixed in 2.5% glutaraldehyde/paraformaldehyde for plastic embedding.    LIGHT MICROSCOPY:    Sections cut from paraffin-embedded material were stained with H&E, Jhony Trichrome, LFB/PAS, and Congo red. One-micron sections were cut from plastic embedded nerve and stained with toluidine blue. The myelinated fiber density was severely reduced in all fascicles with minimal variation between fascicles. There were a few thinly myelinated fibers but no onion bulbs. There were occasional degenerating fibers in each fascicle. Regenerative clusters were very rare to absent.  Perineurium showed focal swelling and lymphocytic cellularity in one area suggesting injury neuroma. The subperineurial and interstitial spaces were normal. Congo red stain was negative for amyloid deposits. H&E preparation showed several epineurial blood vessels with prominent perivascular lymphocytic inflammation, though not transmural inflammation. Fibrinoid necrosis and vessel occlusions were not present.    IMMUNOHISTOCHEMISTRY:    Specific markers for T-lymphocytes (CD3, CD4, CD8), B-lymphocytes (CD20), and macrophages (CD68) were applied. T-cell specific stains showed several large perivascular collections of T-lymphocytes in the epineurium, staining with CD4>CD8 and CD3. B-cell stain was negative. CD68 showed frequent endoneurial macrophages in a pattern characteristic of Wallerian degeneration.    DIAGNOSIS:    Microvasculitic neuropathy with severe axonal loss.    Krishna Payton MD, FAAN  Clinical Professor of Neurology      Again, thank you for allowing me to participate in the care of your patient.      Sincerely,    Krishna Payton MD

## 2025-07-14 ENCOUNTER — VIRTUAL VISIT (OUTPATIENT)
Dept: SURGERY | Facility: CLINIC | Age: OVER 89
End: 2025-07-14
Payer: COMMERCIAL

## 2025-07-14 VITALS — BODY MASS INDEX: 18.05 KG/M2 | HEIGHT: 67 IN | WEIGHT: 115 LBS

## 2025-07-14 DIAGNOSIS — G58.7 MONONEURITIS MULTIPLEX: ICD-10-CM

## 2025-07-14 PROCEDURE — 98016 BRIEF COMUNICAJ TECH-BSD SVC: CPT | Performed by: SURGERY

## 2025-07-14 PROCEDURE — 1126F AMNT PAIN NOTED NONE PRSNT: CPT | Mod: 95 | Performed by: SURGERY

## 2025-07-14 ASSESSMENT — PAIN SCALES - GENERAL: PAINLEVEL_OUTOF10: NO PAIN (0)

## 2025-07-14 NOTE — NURSING NOTE
Current patient location: Pt is at [facility] in [place], [place]    Is the patient currently in the state of [place]? YES    Visit mode: VIDEO    If the visit is dropped, the patient can be reconnected by:VIDEO VISIT: Text to cell phone:   Telephone Information:   Mobile [phone]       Will anyone else be joining the visit? NO  (If patient encounters technical issues they should call [phone]    Are changes needed to the allergy or medication list? Pt states not sure medications she is taking--son gives her medicine.    Are refills needed on medications prescribed by this physician? Discuss with provider    Rooming Documentation:  Questionnaire(s) not pre-assigned    Reason for visit: RECHECK    Pt states 4/10 ankle pain on waking up--states this is normal level for her on waking up.    Bautista LOPEZ

## 2025-07-14 NOTE — PATIENT INSTRUCTIONS
You met with Dr. Aleksandr Garvey.      Today's visit instructions:    Return to the Surgery Clinic via telephone in approximately 2 months to touch base..        If you have questions please contact Leah RN or Clary RN during regular clinic hours, Monday through Friday 7:30 AM - 4:00 PM, or you can contact us via [employer] at anytime.       If you have urgent needs after-hours, weekends, or holidays please call the hospital at [phone] and ask to speak with our on-call General Surgery Team.    Appointment [phone]  Nurse Advice (Leah or Clary): [phone]   Surgery Scheduler (Mary Jane): [phone]  Billing Customer Service:  [phone]  Fax: [phone]

## 2025-07-14 NOTE — LETTER
7/14/2025       RE: Mirlande Simons  [address]  [facility] [address]     Dear Colleague,    Thank you for referring your patient, Mirlande Simons, to the [facility] [place] at [facility]. Please see a copy of my visit note below.    Virtual Visit Details    Type of service:  Video Visit   15 minute video time    Originating Location (pt. Location): [facility] in [place], [place]    Distant Location (provider location):  On-site  Platform used for Video Visit: Telephone    >>>>>>>>>>>>>>>>>>>>>>>>>>>>>>>>>>>>>>>>>>>>>>>>>>>    General Surgery clinic    This is a video visit the patient understood the risks and benefits of the video visit as she was on not able to attend an in person visit to the clinic.    The patient is feeling better however she continues to have leg swelling.  The patient also continues to have electric painful shooting pains that occur slightly less than 25 times a day which was the number of time she had the shooting pains prior to surgery.  There has been no purulence or other evidence of infection associated with the incision.  The patient continues to have leg edema bilaterally.  The patient is continuing with elevation to improve the leg swelling.  Patient denies fevers and chills patient does however use a walker at this time and she hopes to regain her ability to drive a car at some point.    PAST MEDICAL HISTORY:  Past Medical History:   Diagnosis Date     H/O melanoma excision      HTN (hypertension)      Lumbar spondylosis      Osteoporosis      Prediabetes      Scoliosis         PAST SURGICAL HISTORY:  Past Surgical History:   Procedure Laterality Date     IR LUMBAR PUNCTURE  08/27/2019     PROCEDURE GRAFT SURAL NERVE Right 6/26/2025    Procedure: SURGICAL PROCUREMENT, NERVE, SURAL, RIGHT;  Surgeon: Aleksandr Garvey MD;  Location: [facility] OR     right TKA      2011         MEDICATIONS:  Current Outpatient Medications   Medication Sig Dispense Refill     acetaminophen (TYLENOL) 325 MG tablet Take 2 tablets (650 mg) by mouth every 4 hours as needed for mild pain. 50 tablet 0     furosemide (LASIX) 20 MG tablet Take 20 mg by mouth every 48 hours.       hydrochlorothiazide (HYDRODIURIL) 25 MG tablet TAKE 1 TABLET BY MOUTH EVERY DAY IN THE MORNING for 90       ipratropium (ATROVENT) 0.06 % nasal spray SPRAY 2 SPRAYS BY INTRANASAL ROUTE 3 TIMES EVERY DAY IN EACH NOSTRIL       lamoTRIgine (LAMICTAL) 25 MG tablet Take 25 mg by mouth daily.       losartan (COZAAR) 25 MG tablet  (Patient taking differently: Take 25 mg by mouth daily.)       metoprolol succinate ER (TOPROL XL) 50 MG 24 hr tablet Take 50 mg by mouth daily.       Multiple Vitamins-Minerals (PRESERVISION AREDS 2) CAPS Take 1 capsule by mouth daily.       multivitamin (CENTRUM SILVER) tablet Take 1 tablet by mouth daily. Women's       omeprazole (PRILOSEC) 20 MG DR capsule Take 1 capsule (20 mg) by mouth daily. 90 capsule 1     predniSONE (DELTASONE) 20 MG tablet Take 3 tablets (60 mg) by mouth daily for 14 days, THEN 2.5 tablets (50 mg) daily for 14 days, THEN 2 tablets (40 mg) daily. 150 tablet 1     pregabalin (LYRICA) 50 MG capsule Take 1 capsule (50 mg) by mouth 2 times daily. (Patient taking differently: Take 50 mg by mouth daily.) 60 capsule 5     senna-docusate (SENOKOT-S/PERICOLACE) 8.6-50 MG tablet Take 1-2 tablets by mouth 2 times daily. 30 tablet 0     sulfamethoxazole-trimethoprim (BACTRIM DS) 800-160 MG tablet Take 1 tablet by mouth three times a week. 12 tablet 2     timolol maleate (TIMOPTIC-XE) 0.25 % ophthalmic gel-form 1 drop daily       No current facility-administered medications for this visit.        ALLERGIES:  Allergies   Allergen Reactions     Minocycline Rash and Other (See Comments)     History of nodules on legs and diffuse rash on body.    Minocycline toxicity, Rash    Minocycline toxicity, Rash       History of nodules on legs and diffuse rash on body.        SOCIAL HISTORY:  Social History     Socioeconomic History     Marital status: Single     Spouse name: None     Number of children: None     Years of education: None     Highest education level: None   Tobacco Use     Smoking status: Former     Types: Cigarettes     Smokeless tobacco: Never   Substance and Sexual Activity     Alcohol use: Yes     Comment: 1 drink night     Drug use: Never     Social Drivers of Health     Financial Resource Strain: Low Risk  (2/14/2025)    Received from [facility]    Financial Resource Strain      Difficulty of Paying Living Expenses: 3   Food Insecurity: No Food Insecurity (2/14/2025)    Received from [facility]    Food Insecurity      Do you worry your food will run out before you are able to buy more?: 1   Transportation Needs: No Transportation Needs (2/14/2025)    Received from [facility]    Transportation Needs      Does lack of transportation keep you from medical appointments?: 1      Does lack of transportation keep you from work, meetings or getting things that you need?: 1   Social Connections: Socially Integrated (2/14/2025)    Received from [facility]    Social Connections      Do you often feel lonely or isolated from those around you?: 0   Interpersonal Safety: Low Risk  (6/26/2025)    Interpersonal Safety      Do you feel physically and emotionally safe where you currently live?: Yes      Within the past 12 months, have you been hit, slapped, kicked or otherwise physically hurt by someone?: No      Within the past 12 months, have you been humiliated or emotionally abused in other ways by your partner or ex-partner?: No   Housing Stability: Low Risk  (2/14/2025)    Received from [facility]    Housing Stability      What is your housing  situation today?: 1     FAMILY HISTORY:  Family History   Problem Relation Age of Onset     Anesthesia Reaction No family hx of      Venous thrombosis No family hx of      A/P: The patient is doing well following surgery however she does have bilateral leg swelling.  I continue to recommend leg elevation.  The patient will call or return should she develop evidence of a wound infection.  I have reviewed with the patient the pathology report including the presence of inflammation and loss of axons.  The patient understands the need to take her steroid medication.  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  Patient can call or return should she wish at any point.  No official additional follow-up is required for surgery.      Again, thank you for allowing me to participate in the care of your patient.      Sincerely,    Aleksandr Garvey MD

## 2025-07-14 NOTE — PROGRESS NOTES
Virtual Visit Details    Type of service:  Video Visit   15 minute video time    Originating Location (pt. Location): [facility] in [place], [place]    Distant Location (provider location):  On-site  Platform used for Video Visit: Telephone    >>>>>>>>>>>>>>>>>>>>>>>>>>>>>>>>>>>>>>>>>>>>>>>>>>>    General Surgery clinic    This is a video visit the patient understood the risks and benefits of the video visit as she was on not able to attend an in person visit to the clinic.    The patient is feeling better however she continues to have leg swelling.  The patient also continues to have electric painful shooting pains that occur slightly less than 25 times a day which was the number of time she had the shooting pains prior to surgery.  There has been no purulence or other evidence of infection associated with the incision.  The patient continues to have leg edema bilaterally.  The patient is continuing with elevation to improve the leg swelling.  Patient denies fevers and chills patient does however use a walker at this time and she hopes to regain her ability to drive a car at some point.    PAST MEDICAL HISTORY:  Past Medical History:   Diagnosis Date    H/O melanoma excision     HTN (hypertension)     Lumbar spondylosis     Osteoporosis     Prediabetes     Scoliosis         PAST SURGICAL HISTORY:  Past Surgical History:   Procedure Laterality Date    IR LUMBAR PUNCTURE  08/27/2019    PROCEDURE GRAFT SURAL NERVE Right 6/26/2025    Procedure: SURGICAL PROCUREMENT, NERVE, SURAL, RIGHT;  Surgeon: Aleksandr Garvey MD;  Location: [facility] OR    right TKA      2011        MEDICATIONS:  Current Outpatient Medications   Medication Sig Dispense Refill    acetaminophen (TYLENOL) 325 MG tablet Take 2 tablets (650 mg) by mouth every 4 hours as needed for mild pain. 50 tablet 0    furosemide (LASIX) 20 MG tablet Take 20 mg by mouth every 48 hours.      hydrochlorothiazide (HYDRODIURIL) 25 MG tablet TAKE 1 TABLET BY MOUTH EVERY DAY IN  THE MORNING for 90      ipratropium (ATROVENT) 0.06 % nasal spray SPRAY 2 SPRAYS BY INTRANASAL ROUTE 3 TIMES EVERY DAY IN EACH NOSTRIL      lamoTRIgine (LAMICTAL) 25 MG tablet Take 25 mg by mouth daily.      losartan (COZAAR) 25 MG tablet  (Patient taking differently: Take 25 mg by mouth daily.)      metoprolol succinate ER (TOPROL XL) 50 MG 24 hr tablet Take 50 mg by mouth daily.      Multiple Vitamins-Minerals (PRESERVISION AREDS 2) CAPS Take 1 capsule by mouth daily.      multivitamin (CENTRUM SILVER) tablet Take 1 tablet by mouth daily. Women's      omeprazole (PRILOSEC) 20 MG DR capsule Take 1 capsule (20 mg) by mouth daily. 90 capsule 1    predniSONE (DELTASONE) 20 MG tablet Take 3 tablets (60 mg) by mouth daily for 14 days, THEN 2.5 tablets (50 mg) daily for 14 days, THEN 2 tablets (40 mg) daily. 150 tablet 1    pregabalin (LYRICA) 50 MG capsule Take 1 capsule (50 mg) by mouth 2 times daily. (Patient taking differently: Take 50 mg by mouth daily.) 60 capsule 5    senna-docusate (SENOKOT-S/PERICOLACE) 8.6-50 MG tablet Take 1-2 tablets by mouth 2 times daily. 30 tablet 0    sulfamethoxazole-trimethoprim (BACTRIM DS) 800-160 MG tablet Take 1 tablet by mouth three times a week. 12 tablet 2    timolol maleate (TIMOPTIC-XE) 0.25 % ophthalmic gel-form 1 drop daily       No current facility-administered medications for this visit.        ALLERGIES:  Allergies   Allergen Reactions    Minocycline Rash and Other (See Comments)     History of nodules on legs and diffuse rash on body.    Minocycline toxicity, Rash    Minocycline toxicity, Rash      History of nodules on legs and diffuse rash on body.        SOCIAL HISTORY:  Social History     Socioeconomic History    Marital status: Single     Spouse name: None    Number of children: None    Years of education: None    Highest education level: None   Tobacco Use    Smoking status: Former     Types: Cigarettes    Smokeless tobacco: Never   Substance and Sexual Activity     Alcohol use: Yes     Comment: 1 drink night    Drug use: Never     Social Drivers of Health     Financial Resource Strain: Low Risk  (2/14/2025)    Received from [facility]    Financial Resource Strain     Difficulty of Paying Living Expenses: 3   Food Insecurity: No Food Insecurity (2/14/2025)    Received from [facility]    Food Insecurity     Do you worry your food will run out before you are able to buy more?: 1   Transportation Needs: No Transportation Needs (2/14/2025)    Received from [facility]    Transportation Needs     Does lack of transportation keep you from medical appointments?: 1     Does lack of transportation keep you from work, meetings or getting things that you need?: 1   Social Connections: Socially Integrated (2/14/2025)    Received from [facility]    Social Connections     Do you often feel lonely or isolated from those around you?: 0   Interpersonal Safety: Low Risk  (6/26/2025)    Interpersonal Safety     Do you feel physically and emotionally safe where you currently live?: Yes     Within the past 12 months, have you been hit, slapped, kicked or otherwise physically hurt by someone?: No     Within the past 12 months, have you been humiliated or emotionally abused in other ways by your partner or ex-partner?: No   Housing Stability: Low Risk  (2/14/2025)    Received from [facility]    Housing Stability     What is your housing situation today?: 1     FAMILY HISTORY:  Family History   Problem Relation Age of Onset    Anesthesia Reaction No family hx of     Venous thrombosis No family hx of      A/P: The patient is doing well following surgery however she does have bilateral leg swelling.  I continue to recommend leg elevation.  The patient will call or return should she develop evidence of a wound infection.  I have reviewed  with the patient the pathology report including the presence of inflammation and loss of axons.  The patient understands the need to take her steroid medication.  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  Patient can call or return should she wish at any point.  No official additional follow-up is required for surgery.

## 2025-07-24 LAB — SCANNED LAB RESULT: NORMAL

## 2025-08-16 ENCOUNTER — HEALTH MAINTENANCE LETTER (OUTPATIENT)
Age: OVER 89
End: 2025-08-16

## (undated) DEVICE — COVER ULTRASOUND PROBE W/GEL FLEXI-FEEL 6"X58" LF  25-FF658

## (undated) DEVICE — LINEN TOWEL PACK X6 WHITE 5487

## (undated) DEVICE — SU PDS II 7-0 BV-1DA 30" Z155H

## (undated) DEVICE — DRAPE STOCKINETTE 6" 8564

## (undated) DEVICE — TONGUE DEPRESSOR STERILE 6023

## (undated) DEVICE — SU VICRYL+ 3-0 27IN SH UND VCP416H

## (undated) DEVICE — LINEN GOWN XLG 5407

## (undated) DEVICE — SUCTION MANIFOLD NEPTUNE 2 SYS 4 PORT 0702-020-000

## (undated) DEVICE — KNIFE HANDLE W/15 BLADE 371615

## (undated) DEVICE — CUP AND LID 2PK 2OZ STERILE  SSK9006A

## (undated) DEVICE — SUCTION TIP YANKAUER W/O VENT K86

## (undated) DEVICE — DECANTER FLUID L3 IN TRANSFER STRL LF DYNJDEC03

## (undated) DEVICE — SUTURE BOOTS 051003PBX

## (undated) DEVICE — SURGICEL ABSORBABLE HEMOSTAT SNOW 4"X4" 2083

## (undated) DEVICE — DRAPE SHEET REV FOLD 3/4 9349

## (undated) DEVICE — RAD KNIFE HANDLE W/11 BLADE DISPOSABLE 371611

## (undated) DEVICE — GLOVE PROTEXIS POWDER FREE ORANGE 7.5  2D72PT75X

## (undated) DEVICE — SU MONOCRYL 4-0 PS-2 27" UND Y426H

## (undated) DEVICE — PREP CHLORAPREP 26ML TINTED HI-LITE ORANGE 930815

## (undated) DEVICE — GLOVE PROTEXIS BLUE W/NEU-THERA 7.5  2D73EB75

## (undated) DEVICE — CLIP HORIZON SM RED WIDE SLOT 001201

## (undated) DEVICE — LINEN TOWEL PACK X5 5464

## (undated) DEVICE — Device

## (undated) DEVICE — SU SILK 3-0 TIE 12X30" A304H

## (undated) DEVICE — DRAPE EXTREMITY UPPER 120X76" 29414

## (undated) DEVICE — SU DERMABOND ADVANCED .7ML DNX12

## (undated) RX ORDER — GLYCOPYRROLATE 0.2 MG/ML
INJECTION, SOLUTION INTRAMUSCULAR; INTRAVENOUS
Status: DISPENSED
Start: 2025-06-26

## (undated) RX ORDER — PROPOFOL 10 MG/ML
INJECTION, EMULSION INTRAVENOUS
Status: DISPENSED
Start: 2025-06-26

## (undated) RX ORDER — FENTANYL CITRATE-0.9 % NACL/PF 10 MCG/ML
PLASTIC BAG, INJECTION (ML) INTRAVENOUS
Status: DISPENSED
Start: 2025-06-26

## (undated) RX ORDER — CALCIUM CHLORIDE 100 MG/ML
INJECTION INTRAVENOUS; INTRAVENTRICULAR
Status: DISPENSED
Start: 2025-06-26

## (undated) RX ORDER — LABETALOL HYDROCHLORIDE 5 MG/ML
INJECTION, SOLUTION INTRAVENOUS
Status: DISPENSED
Start: 2025-06-26

## (undated) RX ORDER — FENTANYL CITRATE 50 UG/ML
INJECTION, SOLUTION INTRAMUSCULAR; INTRAVENOUS
Status: DISPENSED
Start: 2025-06-26

## (undated) RX ORDER — ONDANSETRON 2 MG/ML
INJECTION INTRAMUSCULAR; INTRAVENOUS
Status: DISPENSED
Start: 2025-06-26

## (undated) RX ORDER — FUROSEMIDE 10 MG/ML
INJECTION INTRAMUSCULAR; INTRAVENOUS
Status: DISPENSED
Start: 2025-06-26

## (undated) RX ORDER — ACETAMINOPHEN 325 MG/1
TABLET ORAL
Status: DISPENSED
Start: 2025-06-26

## (undated) RX ORDER — METOPROLOL TARTRATE 25 MG/1
TABLET, FILM COATED ORAL
Status: DISPENSED
Start: 2025-06-26

## (undated) RX ORDER — LIDOCAINE HYDROCHLORIDE 10 MG/ML
INJECTION, SOLUTION EPIDURAL; INFILTRATION; INTRACAUDAL; PERINEURAL
Status: DISPENSED
Start: 2025-06-26

## (undated) RX ORDER — CEFAZOLIN SODIUM/WATER 2 G/20 ML
SYRINGE (ML) INTRAVENOUS
Status: DISPENSED
Start: 2025-06-26

## (undated) RX ORDER — BUPIVACAINE HYDROCHLORIDE 2.5 MG/ML
INJECTION, SOLUTION EPIDURAL; INFILTRATION; INTRACAUDAL; PERINEURAL
Status: DISPENSED
Start: 2025-06-26

## (undated) RX ORDER — DEXAMETHASONE SODIUM PHOSPHATE 4 MG/ML
INJECTION, SOLUTION INTRA-ARTICULAR; INTRALESIONAL; INTRAMUSCULAR; INTRAVENOUS; SOFT TISSUE
Status: DISPENSED
Start: 2025-06-26